# Patient Record
Sex: FEMALE | Race: WHITE | Employment: FULL TIME | URBAN - NONMETROPOLITAN AREA
[De-identification: names, ages, dates, MRNs, and addresses within clinical notes are randomized per-mention and may not be internally consistent; named-entity substitution may affect disease eponyms.]

---

## 2017-03-30 ENCOUNTER — HOSPITAL ENCOUNTER (OUTPATIENT)
Dept: GENERAL RADIOLOGY | Age: 22
Discharge: HOME | End: 2017-03-30

## 2017-03-30 DIAGNOSIS — N63.10 LUMP OF RIGHT BREAST: ICD-10-CM

## 2018-11-14 ENCOUNTER — HOSPITAL ENCOUNTER (OUTPATIENT)
Dept: CARDIAC REHAB | Age: 23
Discharge: HOME | End: 2018-11-14
Payer: COMMERCIAL

## 2018-11-14 PROCEDURE — 93226 XTRNL ECG REC<48 HR SCAN A/R: CPT

## 2018-11-14 PROCEDURE — 93225 XTRNL ECG REC<48 HRS REC: CPT

## 2018-11-30 ENCOUNTER — HOSPITAL ENCOUNTER (EMERGENCY)
Age: 23
Discharge: HOME | End: 2018-12-01
Attending: EMERGENCY MEDICINE
Payer: COMMERCIAL

## 2018-11-30 DIAGNOSIS — T88.1XXA POSTIMMUNIZATION REACTION, INITIAL ENCOUNTER: ICD-10-CM

## 2018-11-30 DIAGNOSIS — R79.89 ELEVATED TSH: ICD-10-CM

## 2018-11-30 DIAGNOSIS — R53.83 FATIGUE, UNSPECIFIED TYPE: Primary | ICD-10-CM

## 2018-11-30 LAB
ALBUMIN SERPL-MCNC: 4.6 GM/DL (ref 3.4–5)
ALP BLD-CCNC: 43 IU/L (ref 40–129)
ALT SERPL-CCNC: 15 U/L (ref 10–40)
ANION GAP SERPL CALCULATED.3IONS-SCNC: 13 MMOL/L (ref 4–16)
AST SERPL-CCNC: 15 IU/L (ref 15–37)
BACTERIA: ABNORMAL /HPF
BASOPHILS ABSOLUTE: 0 K/CU MM
BASOPHILS RELATIVE PERCENT: 0.6 % (ref 0–1)
BILIRUB SERPL-MCNC: 0.3 MG/DL (ref 0–1)
BILIRUBIN URINE: NEGATIVE MG/DL
BLOOD, URINE: ABNORMAL
BUN BLDV-MCNC: 8 MG/DL (ref 6–23)
CALCIUM SERPL-MCNC: 10.3 MG/DL (ref 8.3–10.6)
CAST TYPE: ABNORMAL /HPF
CHLORIDE BLD-SCNC: 107 MMOL/L (ref 99–110)
CLARITY: CLEAR
CO2: 25 MMOL/L (ref 21–32)
COLOR: ABNORMAL
CREAT SERPL-MCNC: 0.7 MG/DL (ref 0.6–1.1)
CRYSTAL TYPE: ABNORMAL /HPF
DIFFERENTIAL TYPE: ABNORMAL
EOSINOPHILS ABSOLUTE: 0.2 K/CU MM
EOSINOPHILS RELATIVE PERCENT: 3.2 % (ref 0–3)
EPITHELIAL CELLS, UA: ABNORMAL /HPF
GFR AFRICAN AMERICAN: >60 ML/MIN/1.73M2
GFR NON-AFRICAN AMERICAN: >60 ML/MIN/1.73M2
GLUCOSE BLD-MCNC: 89 MG/DL (ref 70–99)
GLUCOSE, URINE: NEGATIVE MG/DL
HCT VFR BLD CALC: 38.9 % (ref 37–47)
HEMOGLOBIN: 12.9 GM/DL (ref 12.5–16)
IMMATURE NEUTROPHIL %: 0.3 % (ref 0–0.43)
KETONES, URINE: NEGATIVE MG/DL
LEUKOCYTE ESTERASE, URINE: ABNORMAL
LYMPHOCYTES ABSOLUTE: 3.1 K/CU MM
LYMPHOCYTES RELATIVE PERCENT: 43.1 % (ref 24–44)
MCH RBC QN AUTO: 29.8 PG (ref 27–31)
MCHC RBC AUTO-ENTMCNC: 33.2 % (ref 32–36)
MCV RBC AUTO: 89.8 FL (ref 78–100)
MONOCYTES ABSOLUTE: 0.4 K/CU MM
MONOCYTES RELATIVE PERCENT: 5.6 % (ref 0–4)
MUCUS: NEGATIVE HPF
NITRITE URINE, QUANTITATIVE: NEGATIVE
PDW BLD-RTO: 13.3 % (ref 11.7–14.9)
PH, URINE: 6.5 (ref 5–8)
PLATELET # BLD: 315 K/CU MM (ref 140–440)
PMV BLD AUTO: 9.4 FL (ref 7.5–11.1)
POTASSIUM SERPL-SCNC: 3.9 MMOL/L (ref 3.5–5.1)
PROTEIN UA: ABNORMAL MG/DL
RAPID INFLUENZA  B AGN: NEGATIVE
RAPID INFLUENZA A AGN: NEGATIVE
RBC # BLD: 4.33 M/CU MM (ref 4.2–5.4)
RBC URINE: ABNORMAL /HPF (ref 0–6)
SEGMENTED NEUTROPHILS ABSOLUTE COUNT: 3.4 K/CU MM
SEGMENTED NEUTROPHILS RELATIVE PERCENT: 47.2 % (ref 36–66)
SODIUM BLD-SCNC: 145 MMOL/L (ref 135–145)
SPECIFIC GRAVITY UA: <1.005 (ref 1–1.03)
TOTAL CK: 62 IU/L (ref 26–140)
TOTAL IMMATURE NEUTOROPHIL: 0.02 K/CU MM
TOTAL PROTEIN: 7.2 GM/DL (ref 6.4–8.2)
UROBILINOGEN, URINE: 0.2 MG/DL (ref 0.2–1)
VOLUME, (UVOL): 12 ML (ref 10–12)
WBC # BLD: 7.1 K/CU MM (ref 4–10.5)
WBC UA: ABNORMAL /HPF (ref 0–5)

## 2018-11-30 PROCEDURE — 87804 INFLUENZA ASSAY W/OPTIC: CPT

## 2018-11-30 PROCEDURE — 80053 COMPREHEN METABOLIC PANEL: CPT

## 2018-11-30 PROCEDURE — 84443 ASSAY THYROID STIM HORMONE: CPT

## 2018-11-30 PROCEDURE — 81001 URINALYSIS AUTO W/SCOPE: CPT

## 2018-11-30 PROCEDURE — 82550 ASSAY OF CK (CPK): CPT

## 2018-11-30 PROCEDURE — 85025 COMPLETE CBC W/AUTO DIFF WBC: CPT

## 2018-11-30 PROCEDURE — 99283 EMERGENCY DEPT VISIT LOW MDM: CPT

## 2018-11-30 PROCEDURE — 84703 CHORIONIC GONADOTROPIN ASSAY: CPT

## 2018-11-30 RX ORDER — IBUPROFEN 800 MG/1
800 TABLET ORAL ONCE
Status: DISCONTINUED | OUTPATIENT
Start: 2018-11-30 | End: 2018-12-01 | Stop reason: HOSPADM

## 2018-12-01 VITALS
HEIGHT: 67 IN | RESPIRATION RATE: 14 BRPM | BODY MASS INDEX: 25.9 KG/M2 | OXYGEN SATURATION: 99 % | WEIGHT: 165 LBS | HEART RATE: 80 BPM | SYSTOLIC BLOOD PRESSURE: 122 MMHG | DIASTOLIC BLOOD PRESSURE: 68 MMHG | TEMPERATURE: 98.6 F

## 2018-12-01 LAB
HCG QUALITATIVE: NEGATIVE
TSH HIGH SENSITIVITY: 6.22 UIU/ML (ref 0.27–4.2)

## 2019-02-12 ENCOUNTER — APPOINTMENT (OUTPATIENT)
Dept: GENERAL RADIOLOGY | Age: 24
End: 2019-02-12
Payer: COMMERCIAL

## 2019-02-12 ENCOUNTER — HOSPITAL ENCOUNTER (EMERGENCY)
Age: 24
Discharge: HOME | End: 2019-02-12
Attending: EMERGENCY MEDICINE
Payer: COMMERCIAL

## 2019-02-12 VITALS
HEIGHT: 67 IN | TEMPERATURE: 97.6 F | SYSTOLIC BLOOD PRESSURE: 136 MMHG | WEIGHT: 166 LBS | HEART RATE: 77 BPM | RESPIRATION RATE: 20 BRPM | OXYGEN SATURATION: 99 % | BODY MASS INDEX: 26.06 KG/M2 | DIASTOLIC BLOOD PRESSURE: 95 MMHG

## 2019-02-12 DIAGNOSIS — F41.1 ANXIETY STATE: ICD-10-CM

## 2019-02-12 DIAGNOSIS — R07.89 ATYPICAL CHEST PAIN: Primary | ICD-10-CM

## 2019-02-12 LAB
ANION GAP SERPL CALCULATED.3IONS-SCNC: 14 MMOL/L (ref 4–16)
BASOPHILS ABSOLUTE: 0.1 K/CU MM
BASOPHILS RELATIVE PERCENT: 0.9 % (ref 0–1)
BUN BLDV-MCNC: 11 MG/DL (ref 6–23)
CALCIUM SERPL-MCNC: 9.9 MG/DL (ref 8.3–10.6)
CHLORIDE BLD-SCNC: 101 MMOL/L (ref 99–110)
CO2: 26 MMOL/L (ref 21–32)
CREAT SERPL-MCNC: 0.8 MG/DL (ref 0.6–1.1)
DIFFERENTIAL TYPE: ABNORMAL
EOSINOPHILS ABSOLUTE: 0.3 K/CU MM
EOSINOPHILS RELATIVE PERCENT: 3.9 % (ref 0–3)
GFR AFRICAN AMERICAN: >60 ML/MIN/1.73M2
GFR NON-AFRICAN AMERICAN: >60 ML/MIN/1.73M2
GLUCOSE BLD-MCNC: 97 MG/DL (ref 70–99)
HCT VFR BLD CALC: 42.1 % (ref 37–47)
HEMOGLOBIN: 13.9 GM/DL (ref 12.5–16)
IMMATURE NEUTROPHIL %: 0.1 % (ref 0–0.43)
LYMPHOCYTES ABSOLUTE: 2.8 K/CU MM
LYMPHOCYTES RELATIVE PERCENT: 41.2 % (ref 24–44)
MCH RBC QN AUTO: 29.4 PG (ref 27–31)
MCHC RBC AUTO-ENTMCNC: 33 % (ref 32–36)
MCV RBC AUTO: 89.2 FL (ref 78–100)
MONOCYTES ABSOLUTE: 0.4 K/CU MM
MONOCYTES RELATIVE PERCENT: 6.4 % (ref 0–4)
PDW BLD-RTO: 12.8 % (ref 11.7–14.9)
PLATELET # BLD: 326 K/CU MM (ref 140–440)
PMV BLD AUTO: 9.2 FL (ref 7.5–11.1)
POTASSIUM SERPL-SCNC: 3.6 MMOL/L (ref 3.5–5.1)
RBC # BLD: 4.72 M/CU MM (ref 4.2–5.4)
SEGMENTED NEUTROPHILS ABSOLUTE COUNT: 3.2 K/CU MM
SEGMENTED NEUTROPHILS RELATIVE PERCENT: 47.5 % (ref 36–66)
SODIUM BLD-SCNC: 141 MMOL/L (ref 135–145)
TOTAL IMMATURE NEUTOROPHIL: 0.01 K/CU MM
TROPONIN T: <0.01 NG/ML
WBC # BLD: 6.8 K/CU MM (ref 4–10.5)

## 2019-02-12 PROCEDURE — 99285 EMERGENCY DEPT VISIT HI MDM: CPT

## 2019-02-12 PROCEDURE — 93005 ELECTROCARDIOGRAM TRACING: CPT | Performed by: EMERGENCY MEDICINE

## 2019-02-12 PROCEDURE — 71045 X-RAY EXAM CHEST 1 VIEW: CPT

## 2019-02-12 PROCEDURE — 96374 THER/PROPH/DIAG INJ IV PUSH: CPT

## 2019-02-12 PROCEDURE — 6370000000 HC RX 637 (ALT 250 FOR IP): Performed by: EMERGENCY MEDICINE

## 2019-02-12 PROCEDURE — 85025 COMPLETE CBC W/AUTO DIFF WBC: CPT

## 2019-02-12 PROCEDURE — 84484 ASSAY OF TROPONIN QUANT: CPT

## 2019-02-12 PROCEDURE — 80048 BASIC METABOLIC PNL TOTAL CA: CPT

## 2019-02-12 RX ORDER — LORAZEPAM 2 MG/ML
1 INJECTION INTRAMUSCULAR ONCE
Status: DISCONTINUED | OUTPATIENT
Start: 2019-02-12 | End: 2019-02-12 | Stop reason: HOSPADM

## 2019-02-12 RX ORDER — HYDROXYZINE HYDROCHLORIDE 25 MG/1
25 TABLET, FILM COATED ORAL EVERY 8 HOURS PRN
Qty: 30 TABLET | Refills: 0 | Status: SHIPPED | OUTPATIENT
Start: 2019-02-12 | End: 2019-08-01

## 2019-02-12 RX ORDER — HYDROXYZINE PAMOATE 25 MG/1
50 CAPSULE ORAL ONCE
Status: COMPLETED | OUTPATIENT
Start: 2019-02-12 | End: 2019-02-12

## 2019-02-12 RX ADMIN — HYDROXYZINE PAMOATE 25 MG: 25 CAPSULE ORAL at 20:12

## 2019-02-12 ASSESSMENT — PAIN DESCRIPTION - PAIN TYPE: TYPE: ACUTE PAIN

## 2019-02-12 ASSESSMENT — ENCOUNTER SYMPTOMS
EYES NEGATIVE: 1
SHORTNESS OF BREATH: 0
RESPIRATORY NEGATIVE: 1
VOMITING: 0
HEARTBURN: 0
BACK PAIN: 0
NAUSEA: 0
GASTROINTESTINAL NEGATIVE: 1
ORTHOPNEA: 0

## 2019-02-12 ASSESSMENT — PAIN SCALES - GENERAL: PAINLEVEL_OUTOF10: 5

## 2019-02-12 ASSESSMENT — PAIN DESCRIPTION - LOCATION: LOCATION: CHEST

## 2019-02-13 PROCEDURE — 93010 ELECTROCARDIOGRAM REPORT: CPT | Performed by: INTERNAL MEDICINE

## 2019-02-15 LAB
EKG ATRIAL RATE: 98 BPM
EKG DIAGNOSIS: NORMAL
EKG P AXIS: 71 DEGREES
EKG P-R INTERVAL: 126 MS
EKG Q-T INTERVAL: 354 MS
EKG QRS DURATION: 64 MS
EKG QTC CALCULATION (BAZETT): 451 MS
EKG R AXIS: 58 DEGREES
EKG T AXIS: 48 DEGREES
EKG VENTRICULAR RATE: 98 BPM

## 2019-08-01 ENCOUNTER — APPOINTMENT (OUTPATIENT)
Dept: GENERAL RADIOLOGY | Age: 24
End: 2019-08-01

## 2019-08-01 ENCOUNTER — HOSPITAL ENCOUNTER (EMERGENCY)
Age: 24
Discharge: HOME | End: 2019-08-01
Attending: EMERGENCY MEDICINE

## 2019-08-01 VITALS
SYSTOLIC BLOOD PRESSURE: 132 MMHG | TEMPERATURE: 99.2 F | HEART RATE: 125 BPM | OXYGEN SATURATION: 97 % | RESPIRATION RATE: 16 BRPM | DIASTOLIC BLOOD PRESSURE: 98 MMHG

## 2019-08-01 DIAGNOSIS — B34.9 VIRAL ILLNESS: Primary | ICD-10-CM

## 2019-08-01 DIAGNOSIS — R53.83 FATIGUE, UNSPECIFIED TYPE: ICD-10-CM

## 2019-08-01 LAB
BACTERIA: ABNORMAL /HPF
BILIRUBIN URINE: NEGATIVE MG/DL
BLOOD, URINE: NEGATIVE
CAST TYPE: NEGATIVE /HPF
CLARITY: CLEAR
COLOR: YELLOW
CRYSTAL TYPE: NEGATIVE /HPF
EPITHELIAL CELLS, UA: ABNORMAL /HPF
GLUCOSE, URINE: NEGATIVE MG/DL
INTERPRETATION: NORMAL
KETONES, URINE: ABNORMAL MG/DL
LEUKOCYTE ESTERASE, URINE: NEGATIVE
NITRITE URINE, QUANTITATIVE: NEGATIVE
PH, URINE: 5 (ref 5–8)
PREGNANCY, URINE: NEGATIVE
PROTEIN UA: NEGATIVE MG/DL
RBC URINE: NEGATIVE /HPF (ref 0–6)
SPECIFIC GRAVITY UA: 1.01 (ref 1–1.03)
SPECIFIC GRAVITY, URINE: 1.01 (ref 1–1.03)
UROBILINOGEN, URINE: 0.2 MG/DL (ref 0.2–1)
WBC UA: ABNORMAL /HPF (ref 0–5)

## 2019-08-01 PROCEDURE — 81001 URINALYSIS AUTO W/SCOPE: CPT

## 2019-08-01 PROCEDURE — 6370000000 HC RX 637 (ALT 250 FOR IP): Performed by: EMERGENCY MEDICINE

## 2019-08-01 PROCEDURE — 81025 URINE PREGNANCY TEST: CPT

## 2019-08-01 PROCEDURE — 99283 EMERGENCY DEPT VISIT LOW MDM: CPT

## 2019-08-01 PROCEDURE — 71046 X-RAY EXAM CHEST 2 VIEWS: CPT

## 2019-08-01 RX ORDER — ACETAMINOPHEN 500 MG
1000 TABLET ORAL ONCE
Status: COMPLETED | OUTPATIENT
Start: 2019-08-01 | End: 2019-08-01

## 2019-08-01 RX ORDER — ACETAMINOPHEN 80 MG
TABLET,CHEWABLE ORAL
Status: DISCONTINUED
Start: 2019-08-01 | End: 2019-08-01 | Stop reason: HOSPADM

## 2019-08-01 RX ADMIN — ACETAMINOPHEN 1000 MG: 500 TABLET ORAL at 01:31

## 2019-08-01 ASSESSMENT — ENCOUNTER SYMPTOMS
HEMATOCHEZIA: 0
SHORTNESS OF BREATH: 0
VOMITING: 0
NAUSEA: 1

## 2019-08-01 ASSESSMENT — PAIN SCALES - GENERAL: PAINLEVEL_OUTOF10: 10

## 2019-08-01 ASSESSMENT — PAIN DESCRIPTION - LOCATION: LOCATION: GENERALIZED

## 2019-08-01 NOTE — ED PROVIDER NOTES
diagnosis, differential diagnosis, medications, medication use,  medication safety and medication interactions have been explained and outlined to this patient for thispatient encounter. I have stressed need for follow up and reexamination for this encounter and or return to the emergency department if any changes or any concern. Final Impression    1. Viral illness    2.  Fatigue, unspecified type              287 Sofie Dunham DO  08/01/19 8090

## 2019-09-21 ENCOUNTER — HOSPITAL ENCOUNTER (EMERGENCY)
Age: 24
Discharge: HOME | End: 2019-09-21
Attending: EMERGENCY MEDICINE
Payer: COMMERCIAL

## 2019-09-21 VITALS
HEART RATE: 98 BPM | OXYGEN SATURATION: 98 % | TEMPERATURE: 97.8 F | DIASTOLIC BLOOD PRESSURE: 93 MMHG | SYSTOLIC BLOOD PRESSURE: 135 MMHG | WEIGHT: 163 LBS | HEIGHT: 68 IN | RESPIRATION RATE: 18 BRPM | BODY MASS INDEX: 24.71 KG/M2

## 2019-09-21 DIAGNOSIS — R00.2 PALPITATIONS: ICD-10-CM

## 2019-09-21 DIAGNOSIS — F41.1 ANXIETY STATE: Primary | ICD-10-CM

## 2019-09-21 PROCEDURE — 99283 EMERGENCY DEPT VISIT LOW MDM: CPT

## 2019-09-21 PROCEDURE — 93010 ELECTROCARDIOGRAM REPORT: CPT | Performed by: INTERNAL MEDICINE

## 2019-09-21 PROCEDURE — 93005 ELECTROCARDIOGRAM TRACING: CPT | Performed by: EMERGENCY MEDICINE

## 2019-09-21 RX ORDER — HYDROXYZINE HYDROCHLORIDE 25 MG/1
25 TABLET, FILM COATED ORAL 3 TIMES DAILY PRN
COMMUNITY
End: 2022-01-06 | Stop reason: ALTCHOICE

## 2019-09-21 ASSESSMENT — ENCOUNTER SYMPTOMS
ALLERGIC/IMMUNOLOGIC NEGATIVE: 1
RESPIRATORY NEGATIVE: 1
EYES NEGATIVE: 1
GASTROINTESTINAL NEGATIVE: 1

## 2019-09-21 NOTE — ED NOTES
Patient presents to the ED with a panic attack, patient states that she had a couple days this past week that that was \"traumatizing\", patient admits that she has not taken her medication, and realizes that there are times that will require her to use it.        Kalyn Carey RN  09/21/19 0224

## 2019-09-25 LAB
EKG ATRIAL RATE: 97 BPM
EKG DIAGNOSIS: NORMAL
EKG P AXIS: 78 DEGREES
EKG P-R INTERVAL: 154 MS
EKG Q-T INTERVAL: 372 MS
EKG QRS DURATION: 80 MS
EKG QTC CALCULATION (BAZETT): 472 MS
EKG R AXIS: 66 DEGREES
EKG T AXIS: 56 DEGREES
EKG VENTRICULAR RATE: 97 BPM

## 2019-10-05 ENCOUNTER — HOSPITAL ENCOUNTER (OUTPATIENT)
Age: 24
Discharge: HOME | End: 2019-10-05
Payer: COMMERCIAL

## 2019-10-05 LAB
ALBUMIN SERPL-MCNC: 4.6 GM/DL (ref 3.4–5)
ALP BLD-CCNC: 44 IU/L (ref 40–129)
ALT SERPL-CCNC: 14 U/L (ref 10–40)
ANION GAP SERPL CALCULATED.3IONS-SCNC: 9 MMOL/L (ref 4–16)
AST SERPL-CCNC: 16 IU/L (ref 15–37)
BILIRUB SERPL-MCNC: 0.5 MG/DL (ref 0–1)
BUN BLDV-MCNC: 13 MG/DL (ref 6–23)
CALCIUM SERPL-MCNC: 9.8 MG/DL (ref 8.3–10.6)
CHLORIDE BLD-SCNC: 105 MMOL/L (ref 99–110)
CO2: 28 MMOL/L (ref 21–32)
CREAT SERPL-MCNC: 0.8 MG/DL (ref 0.6–1.1)
GFR AFRICAN AMERICAN: >60 ML/MIN/1.73M2
GFR NON-AFRICAN AMERICAN: >60 ML/MIN/1.73M2
GLUCOSE FASTING: 90 MG/DL (ref 70–99)
HCT VFR BLD CALC: 39.6 % (ref 37–47)
HEMOGLOBIN: 12.7 GM/DL (ref 12.5–16)
MCH RBC QN AUTO: 29.1 PG (ref 27–31)
MCHC RBC AUTO-ENTMCNC: 32.1 % (ref 32–36)
MCV RBC AUTO: 90.6 FL (ref 78–100)
PDW BLD-RTO: 14 % (ref 11.7–14.9)
PLATELET # BLD: 288 K/CU MM (ref 140–440)
PMV BLD AUTO: 9.2 FL (ref 7.5–11.1)
POTASSIUM SERPL-SCNC: 4.3 MMOL/L (ref 3.5–5.1)
RBC # BLD: 4.37 M/CU MM (ref 4.2–5.4)
SODIUM BLD-SCNC: 142 MMOL/L (ref 135–145)
TOTAL PROTEIN: 7.4 GM/DL (ref 6.4–8.2)
WBC # BLD: 5.7 K/CU MM (ref 4–10.5)

## 2019-10-05 PROCEDURE — 80061 LIPID PANEL: CPT

## 2019-10-05 PROCEDURE — 36415 COLL VENOUS BLD VENIPUNCTURE: CPT

## 2019-10-05 PROCEDURE — 80053 COMPREHEN METABOLIC PANEL: CPT

## 2019-10-05 PROCEDURE — 85027 COMPLETE CBC AUTOMATED: CPT

## 2019-10-06 LAB
CHOLESTEROL, FASTING: 102 MG/DL
HDLC SERPL-MCNC: 68 MG/DL
LDL CHOLESTEROL DIRECT: 31 MG/DL
TRIGLYCERIDE, FASTING: 11 MG/DL

## 2019-11-05 ENCOUNTER — HOSPITAL ENCOUNTER (EMERGENCY)
Age: 24
Discharge: HOME | End: 2019-11-05
Attending: EMERGENCY MEDICINE
Payer: MEDICAID

## 2019-11-05 VITALS
OXYGEN SATURATION: 100 % | TEMPERATURE: 98 F | BODY MASS INDEX: 25.9 KG/M2 | HEART RATE: 84 BPM | RESPIRATION RATE: 15 BRPM | WEIGHT: 165 LBS | DIASTOLIC BLOOD PRESSURE: 87 MMHG | HEIGHT: 67 IN | SYSTOLIC BLOOD PRESSURE: 129 MMHG

## 2019-11-05 DIAGNOSIS — F41.1 ANXIETY STATE: Primary | ICD-10-CM

## 2019-11-05 PROCEDURE — 93010 ELECTROCARDIOGRAM REPORT: CPT | Performed by: INTERNAL MEDICINE

## 2019-11-05 PROCEDURE — 99284 EMERGENCY DEPT VISIT MOD MDM: CPT

## 2019-11-05 PROCEDURE — 93005 ELECTROCARDIOGRAM TRACING: CPT | Performed by: EMERGENCY MEDICINE

## 2019-11-05 RX ORDER — FLUOXETINE HYDROCHLORIDE 20 MG/1
20 CAPSULE ORAL DAILY
COMMUNITY
End: 2020-01-03 | Stop reason: ALTCHOICE

## 2019-11-07 LAB
EKG ATRIAL RATE: 90 BPM
EKG DIAGNOSIS: NORMAL
EKG P AXIS: 63 DEGREES
EKG P-R INTERVAL: 122 MS
EKG Q-T INTERVAL: 372 MS
EKG QRS DURATION: 64 MS
EKG QTC CALCULATION (BAZETT): 455 MS
EKG R AXIS: 58 DEGREES
EKG T AXIS: 50 DEGREES
EKG VENTRICULAR RATE: 90 BPM

## 2020-01-03 ENCOUNTER — HOSPITAL ENCOUNTER (EMERGENCY)
Age: 25
Discharge: HOME | End: 2020-01-03
Attending: EMERGENCY MEDICINE
Payer: COMMERCIAL

## 2020-01-03 ENCOUNTER — APPOINTMENT (OUTPATIENT)
Dept: GENERAL RADIOLOGY | Age: 25
End: 2020-01-03
Payer: COMMERCIAL

## 2020-01-03 VITALS
WEIGHT: 157 LBS | TEMPERATURE: 98.6 F | DIASTOLIC BLOOD PRESSURE: 87 MMHG | HEIGHT: 67 IN | OXYGEN SATURATION: 100 % | BODY MASS INDEX: 24.64 KG/M2 | SYSTOLIC BLOOD PRESSURE: 121 MMHG | HEART RATE: 132 BPM | RESPIRATION RATE: 17 BRPM

## 2020-01-03 LAB
ALBUMIN SERPL-MCNC: 4.8 GM/DL (ref 3.4–5)
ALP BLD-CCNC: 44 IU/L (ref 40–129)
ALT SERPL-CCNC: 13 U/L (ref 10–40)
ANION GAP SERPL CALCULATED.3IONS-SCNC: 19 MMOL/L (ref 4–16)
AST SERPL-CCNC: 12 IU/L (ref 15–37)
BASOPHILS ABSOLUTE: 0 K/CU MM
BASOPHILS RELATIVE PERCENT: 0.4 % (ref 0–1)
BILIRUB SERPL-MCNC: 0.6 MG/DL (ref 0–1)
BUN BLDV-MCNC: 14 MG/DL (ref 6–23)
CALCIUM SERPL-MCNC: 9.9 MG/DL (ref 8.3–10.6)
CHLORIDE BLD-SCNC: 99 MMOL/L (ref 99–110)
CO2: 21 MMOL/L (ref 21–32)
CREAT SERPL-MCNC: 0.9 MG/DL (ref 0.6–1.1)
D DIMER: <200 NG/ML(DDU)
DIFFERENTIAL TYPE: ABNORMAL
EOSINOPHILS ABSOLUTE: 0.1 K/CU MM
EOSINOPHILS RELATIVE PERCENT: 2.1 % (ref 0–3)
GFR AFRICAN AMERICAN: >60 ML/MIN/1.73M2
GFR NON-AFRICAN AMERICAN: >60 ML/MIN/1.73M2
GLUCOSE BLD-MCNC: 95 MG/DL (ref 70–99)
HCT VFR BLD CALC: 42.8 % (ref 37–47)
HEMOGLOBIN: 14.4 GM/DL (ref 12.5–16)
IMMATURE NEUTROPHIL %: 0.3 % (ref 0–0.43)
LYMPHOCYTES ABSOLUTE: 1.9 K/CU MM
LYMPHOCYTES RELATIVE PERCENT: 27.8 % (ref 24–44)
MCH RBC QN AUTO: 29.6 PG (ref 27–31)
MCHC RBC AUTO-ENTMCNC: 33.6 % (ref 32–36)
MCV RBC AUTO: 87.9 FL (ref 78–100)
MONOCYTES ABSOLUTE: 0.3 K/CU MM
MONOCYTES RELATIVE PERCENT: 5 % (ref 0–4)
PDW BLD-RTO: 13.2 % (ref 11.7–14.9)
PLATELET # BLD: 319 K/CU MM (ref 140–440)
PMV BLD AUTO: 8.9 FL (ref 7.5–11.1)
POTASSIUM SERPL-SCNC: 3.7 MMOL/L (ref 3.5–5.1)
RBC # BLD: 4.87 M/CU MM (ref 4.2–5.4)
SEGMENTED NEUTROPHILS ABSOLUTE COUNT: 4.4 K/CU MM
SEGMENTED NEUTROPHILS RELATIVE PERCENT: 64.4 % (ref 36–66)
SODIUM BLD-SCNC: 139 MMOL/L (ref 135–145)
TOTAL IMMATURE NEUTOROPHIL: 0.02 K/CU MM
TOTAL PROTEIN: 7.8 GM/DL (ref 6.4–8.2)
TROPONIN T: <0.01 NG/ML
WBC # BLD: 6.8 K/CU MM (ref 4–10.5)

## 2020-01-03 PROCEDURE — 99285 EMERGENCY DEPT VISIT HI MDM: CPT

## 2020-01-03 PROCEDURE — 71045 X-RAY EXAM CHEST 1 VIEW: CPT

## 2020-01-03 PROCEDURE — 93010 ELECTROCARDIOGRAM REPORT: CPT | Performed by: INTERNAL MEDICINE

## 2020-01-03 PROCEDURE — 85379 FIBRIN DEGRADATION QUANT: CPT

## 2020-01-03 PROCEDURE — 6370000000 HC RX 637 (ALT 250 FOR IP): Performed by: EMERGENCY MEDICINE

## 2020-01-03 PROCEDURE — 85025 COMPLETE CBC W/AUTO DIFF WBC: CPT

## 2020-01-03 PROCEDURE — 93005 ELECTROCARDIOGRAM TRACING: CPT | Performed by: EMERGENCY MEDICINE

## 2020-01-03 PROCEDURE — 80053 COMPREHEN METABOLIC PANEL: CPT

## 2020-01-03 PROCEDURE — 84484 ASSAY OF TROPONIN QUANT: CPT

## 2020-01-03 RX ORDER — HYDROXYZINE PAMOATE 25 MG/1
25 CAPSULE ORAL ONCE
Status: COMPLETED | OUTPATIENT
Start: 2020-01-03 | End: 2020-01-03

## 2020-01-03 RX ORDER — M-VIT,TX,IRON,MINS/CALC/FOLIC 27MG-0.4MG
1 TABLET ORAL DAILY
COMMUNITY
End: 2020-07-16

## 2020-01-03 RX ADMIN — HYDROXYZINE PAMOATE 25 MG: 25 CAPSULE ORAL at 15:12

## 2020-01-03 NOTE — ED NOTES
Discharge instructions reviewed with patient. Reviewed prescriptions with patient. No additional questions asked. Voiced understanding. Encouraged patient to follow up as discussed by the ED physician.      Rosas Veronica RN  01/03/20 0970

## 2020-01-03 NOTE — ED PROVIDER NOTES
Emergency Department Encounter  Location: [facility] [address] [place] [address]    Patient: Aziza Piña  MRN: [id number]  : 1995  Date of evaluation: 1/3/2020  ED Provider: Xochilt Nieves DO, FACEP    Chief Complaint:    Chest Pain (States was having chest pain/back off and on this morning. Woke up around 1300 and started moving around and did not feel well. States is feeling anxious. Denies recent illness or cough. Denies fever or chills. Denies nausea or vomiting. States took Buspar for a couple weeks then weaned herself off a couple days ago. )    [place]:  Aziza Piña is a 30 y.o. female that presents to the emergency department complaints of chest and back pain this morning. She became very anxious and states she was not feeling well. She states she can feel her heart beating fast but she is not sure whether it secondary to her feeling anxious or medical problem. Patient was on BuSpar but took herself off that a few days ago because she was having some itchiness. She was able to wean herself down. She did not stop it abruptly. She presents to the emergency department with tachycardia with a heart rate up as high into the 150s. She has had previous panic attacks that have caused this problem before. She states she is working very hard to try to get her anxiety under control. She has been using Vistaril for anxiety. She is requesting a dose of that at this time. ROS:  At least 10 systems reviewed and otherwise acutely negative except as in the [address] Past Medical History:   Diagnosis Date    Generalized anxiety disorder 3/4/2014    Did not tolerate bupar at 15mg BID, does well at 7.5mg BID. Off meds now, however.  Ovarian cyst     Palpitations 4/15/2014    Neg w/u with cardio, see chart.  Panic attacks     Pre-syncope 4/15/2014    Neg w/u with cardio, see chart.        Past Surgical History:   Procedure Laterality Date    ESOPHAGOSCOPY      WISDOM TOOTH EXTRACTION Family History   Problem Relation Age of Onset    Colon Cancer Maternal Grandmother     Cancer Maternal Grandfather     Heart Disease Paternal Grandfather     Heart Attack Paternal Grandfather     Breast Cancer Neg Hx      Social History     Socioeconomic History    Marital status: Single     Spouse name: Not on file    Number of children: Not on file    Years of education: Not on file    Highest education level: Not on file   Occupational History    Not on file   Social Needs    Financial resource strain: Not on file    Food insecurity:     Worry: Not on file     Inability: Not on file    Transportation needs:     Medical: Not on file     Non-medical: Not on file   Tobacco Use    Smoking status: Never Smoker    Smokeless tobacco: Never Used   Substance and Sexual Activity    Alcohol use: Yes     Comment: occasionally    Drug use: No    Sexual activity: Never     Partners: Male   Lifestyle    Physical activity:     Days per week: Not on file     Minutes per session: Not on file    Stress: Not on file   Relationships    Social connections:     Talks on phone: Not on file     Gets together: Not on file     Attends Moravian service: Not on file     Active member of club or organization: Not on file     Attends meetings of clubs or organizations: Not on file     Relationship status: Not on file    Intimate partner violence:     Fear of current or ex partner: Not on file     Emotionally abused: Not on file     Physically abused: Not on file     Forced sexual activity: Not on file   Other Topics Concern    Not on file   Social History Narrative    Not on file     No current facility-administered medications for this encounter.       Current Outpatient Medications   Medication Sig Dispense Refill    Multiple Vitamins-Minerals (THERAPEUTIC MULTIVITAMIN-MINERALS) tablet Take 1 tablet by mouth daily      hydrOXYzine (ATARAX) 25 MG tablet Take 25 mg by mouth 3 times daily as needed for Itching afraid that I am going to give her bad news. The patient is encouraged to call her psychiatric provider to determine what other medication she may try for her anxiety. She is discharged in stable condition at this time. Final Impression:  1.  Anxiety state      DISPOSITION Decision To Discharge    Patient referred to:  Carlos King PA-C  [facility],[address]  [phone]    Schedule an appointment as soon as possible for a visit in 1 week  For follow up    Discharge medications:  Discharge Medication List as of 1/3/2020  4:39 PM        (Please note that portions of this note may have been completed with a voice recognition program. Efforts were made to edit the dictations but occasionally words are mis-transcribed.)    Leilani Nelson DO, [facility], [place], [facility],[address]  Board certified in [facility] Yovani Nash, [address] [place] [address]  01/03/20 5936

## 2020-01-03 NOTE — ED NOTES
Patient had her call light on and stated that she is feeling anxious. She also stated that her head was feeling \"funny'. Dr. Penny Sharpe was notified and went in to speak with this patient.      Shiv Liang  01/03/20 2056

## 2020-01-07 LAB
EKG ATRIAL RATE: 155 BPM
EKG DIAGNOSIS: NORMAL
EKG P AXIS: 79 DEGREES
EKG P-R INTERVAL: 130 MS
EKG Q-T INTERVAL: 252 MS
EKG QRS DURATION: 64 MS
EKG QTC CALCULATION (BAZETT): 404 MS
EKG R AXIS: 64 DEGREES
EKG T AXIS: 42 DEGREES
EKG VENTRICULAR RATE: 155 BPM

## 2020-02-06 ENCOUNTER — HOSPITAL ENCOUNTER (OUTPATIENT)
Age: 25
Setting detail: SPECIMEN
Discharge: HOME | End: 2020-02-06

## 2020-02-06 PROCEDURE — 86481 TB AG RESPONSE T-CELL SUSP: CPT

## 2020-02-09 LAB
NIL (NEGATIVE) SPOT CONTROL: NORMAL
PANEL A SPOT COUNT: 0
PANEL B SPOT COUNT: 1
POSITIVE CONTROL SPOT COUNT: NORMAL
POSITIVE CONTROL SPOT COUNT: NORMAL
TB CELL IMMUNE MEASURE: NORMAL

## 2020-02-12 ENCOUNTER — HOSPITAL ENCOUNTER (OUTPATIENT)
Age: 25
Discharge: HOME | End: 2020-02-12
Payer: COMMERCIAL

## 2020-02-12 LAB
ALBUMIN SERPL-MCNC: 4.6 GM/DL (ref 3.4–5)
ALP BLD-CCNC: 45 IU/L (ref 40–129)
ALT SERPL-CCNC: 19 U/L (ref 10–40)
ANION GAP SERPL CALCULATED.3IONS-SCNC: 4 MMOL/L (ref 4–16)
AST SERPL-CCNC: 15 IU/L (ref 15–37)
BASOPHILS ABSOLUTE: 0 K/CU MM
BASOPHILS RELATIVE PERCENT: 0.6 % (ref 0–1)
BILIRUB SERPL-MCNC: 0.4 MG/DL (ref 0–1)
BUN BLDV-MCNC: 10 MG/DL (ref 6–23)
CALCIUM SERPL-MCNC: 10.3 MG/DL (ref 8.3–10.6)
CHLORIDE BLD-SCNC: 100 MMOL/L (ref 99–110)
CO2: 35 MMOL/L (ref 21–32)
CREAT SERPL-MCNC: 0.7 MG/DL (ref 0.6–1.1)
DIFFERENTIAL TYPE: ABNORMAL
EOSINOPHILS ABSOLUTE: 0.2 K/CU MM
EOSINOPHILS RELATIVE PERCENT: 3.2 % (ref 0–3)
GFR AFRICAN AMERICAN: >60 ML/MIN/1.73M2
GFR NON-AFRICAN AMERICAN: >60 ML/MIN/1.73M2
GLUCOSE FASTING: 89 MG/DL (ref 70–99)
HCT VFR BLD CALC: 39.8 % (ref 37–47)
HEMOGLOBIN: 12.8 GM/DL (ref 12.5–16)
IMMATURE NEUTROPHIL %: 0.2 % (ref 0–0.43)
LYMPHOCYTES ABSOLUTE: 2.5 K/CU MM
LYMPHOCYTES RELATIVE PERCENT: 40.4 % (ref 24–44)
MCH RBC QN AUTO: 29.7 PG (ref 27–31)
MCHC RBC AUTO-ENTMCNC: 32.2 % (ref 32–36)
MCV RBC AUTO: 92.3 FL (ref 78–100)
MONOCYTES ABSOLUTE: 0.4 K/CU MM
MONOCYTES RELATIVE PERCENT: 5.8 % (ref 0–4)
PDW BLD-RTO: 13 % (ref 11.7–14.9)
PLATELET # BLD: 322 K/CU MM (ref 140–440)
PMV BLD AUTO: 9 FL (ref 7.5–11.1)
POTASSIUM SERPL-SCNC: 4.3 MMOL/L (ref 3.5–5.1)
RBC # BLD: 4.31 M/CU MM (ref 4.2–5.4)
SEGMENTED NEUTROPHILS ABSOLUTE COUNT: 3.1 K/CU MM
SEGMENTED NEUTROPHILS RELATIVE PERCENT: 49.8 % (ref 36–66)
SODIUM BLD-SCNC: 139 MMOL/L (ref 135–145)
TOTAL IMMATURE NEUTOROPHIL: 0.01 K/CU MM
TOTAL PROTEIN: 7.6 GM/DL (ref 6.4–8.2)
TSH HIGH SENSITIVITY: 1.55 UIU/ML (ref 0.27–4.2)
WBC # BLD: 6.2 K/CU MM (ref 4–10.5)

## 2020-02-12 PROCEDURE — 36415 COLL VENOUS BLD VENIPUNCTURE: CPT

## 2020-02-12 PROCEDURE — 85025 COMPLETE CBC W/AUTO DIFF WBC: CPT

## 2020-02-12 PROCEDURE — 80053 COMPREHEN METABOLIC PANEL: CPT

## 2020-02-12 PROCEDURE — 84443 ASSAY THYROID STIM HORMONE: CPT

## 2020-07-16 ENCOUNTER — HOSPITAL ENCOUNTER (EMERGENCY)
Age: 25
Discharge: HOME | End: 2020-07-16
Attending: EMERGENCY MEDICINE
Payer: COMMERCIAL

## 2020-07-16 VITALS
RESPIRATION RATE: 20 BRPM | WEIGHT: 155 LBS | OXYGEN SATURATION: 98 % | HEART RATE: 123 BPM | TEMPERATURE: 98.5 F | DIASTOLIC BLOOD PRESSURE: 78 MMHG | SYSTOLIC BLOOD PRESSURE: 125 MMHG | HEIGHT: 68 IN | BODY MASS INDEX: 23.49 KG/M2

## 2020-07-16 PROCEDURE — 99283 EMERGENCY DEPT VISIT LOW MDM: CPT

## 2020-07-16 RX ORDER — ESCITALOPRAM OXALATE 10 MG/1
5 TABLET ORAL DAILY
COMMUNITY
End: 2020-12-04 | Stop reason: ALTCHOICE

## 2020-07-16 ASSESSMENT — ENCOUNTER SYMPTOMS
GASTROINTESTINAL NEGATIVE: 1
RESPIRATORY NEGATIVE: 1
EYES NEGATIVE: 1

## 2020-07-16 NOTE — ED PROVIDER NOTES
The history is provided by the patient. Mental Health Problem     Patient States she went to bed around 2300 and started feeling anxious around 2330.  Throughout the night she cannot sleep because she is thinking about cardiology appt tomorrow and thinking what she needs to tell her doctor causing more anxiety. José Fried is continuously talking and does not let up, so anxious.  Worried about getting sleep to work in AM. [employer] she was scrolling on phone, getting drowsy but then cannot sleep when she puts phone down.  Started Lexapro 2 days ago.    Review of Systems   Constitutional: Negative. HENT: Negative. Eyes: Negative. Respiratory: Negative. Cardiovascular: Negative. Gastrointestinal: Negative. Genitourinary: Negative. Musculoskeletal: Negative. Skin: Negative. Neurological: Negative. All other systems reviewed and are negative.       Family History   Problem Relation Age of Onset    Colon Cancer Maternal Grandmother     Cancer Maternal Grandfather     Heart Disease Paternal Grandfather     Heart Attack Paternal Grandfather     Breast Cancer Neg Hx      Social History     Socioeconomic History    Marital status: Single     Spouse name: Not on file    Number of children: Not on file    Years of education: Not on file    Highest education level: Not on file   Occupational History    Not on file   Social Needs    Financial resource strain: Not on file    Food insecurity     Worry: Not on file     Inability: Not on file   [employer] needs     Medical: Not on file     Non-medical: Not on file   Tobacco Use    Smoking status: Never Smoker    Smokeless tobacco: Never Used   Substance and Sexual Activity    Alcohol use: Not Currently     Comment: occasionally    Drug use: No    Sexual activity: Never     Partners: Male   Lifestyle    Physical activity     Days per week: Not on file     Minutes per session: Not on file    Stress: Not on file   Relationships    Social connections     Talks on phone: Not on file     Gets together: Not on file     Attends Islam service: Not on file     Active member of club or organization: Not on file     Attends meetings of clubs or organizations: Not on file     Relationship status: Not on file    Intimate partner violence     Fear of current or ex partner: Not on file     Emotionally abused: Not on file     Physically abused: Not on file     Forced sexual activity: Not on file   Other Topics Concern    Not on file   Social History Narrative    Not on file     Past Surgical History:   Procedure Laterality Date    ESOPHAGOSCOPY      WISDOM TOOTH EXTRACTION       Past Medical History:   Diagnosis Date    Generalized anxiety disorder 3/4/2014    Did not tolerate bupar at 15mg BID, does well at 7.5mg BID. Off meds now, however.  Ovarian cyst     Palpitations 4/15/2014    Neg w/u with cardio, see chart.  Panic attacks     Pre-syncope 4/15/2014    Neg w/u with cardio, see chart.  PVC (premature ventricular contraction)      No Known Allergies  Prior to Admission medications    Medication Sig Start Date End Date Taking? Authorizing Provider   escitalopram (LEXAPRO) 10 MG tablet Take 5 mg by mouth daily x2 days then increase to 10mg   Yes Historical Provider, MD   hydrOXYzine (ATARAX) 25 MG tablet Take 25 mg by mouth 3 times daily as needed for Itching    Historical Provider, MD       /78   Pulse 123   Temp 98.5 °F (36.9 °C) (Oral)   Resp 20   Ht 5' 7.5\" (1.715 m)   Wt 155 lb (70.3 kg)   LMP 06/19/2020 (Exact Date)   SpO2 98%   BMI 23.92 kg/m²     Physical Exam  Vitals signs reviewed. Constitutional:       Appearance: She is well-developed. HENT:      Head: Normocephalic and atraumatic. Right Ear: External ear normal.      Left Ear: External ear normal.      Nose: Nose normal.   Eyes:      Conjunctiva/sclera: Conjunctivae normal.      Pupils: Pupils are equal, round, and reactive to light.    Neck: Musculoskeletal: Normal range of motion and neck supple. Cardiovascular:      Rate and Rhythm: Normal rate and regular rhythm. Heart sounds: Normal heart sounds. Pulmonary:      Effort: Pulmonary effort is normal.      Breath sounds: Normal breath sounds. Abdominal:      General: Bowel sounds are normal.      Palpations: Abdomen is soft. Musculoskeletal: Normal range of motion. Skin:     General: Skin is warm and dry. Neurological:      Mental Status: She is alert and oriented to person, place, and time. GCS: GCS eye subscore is 4. GCS verbal subscore is 5. GCS motor subscore is 6. Psychiatric:         Mood and Affect: Mood normal.         Behavior: Behavior normal.         Thought Content: Thought content normal.         Judgment: Judgment normal.         MDM:    No results found for this visit on 07/16/20. No need to work up. She has appointment today. She has calmed down and she is asymptomatic  My typical dicussion, presentation,and considerations for this patients' chief complaint, diagnosis, differential diagnosis, medications, medication use,  medication safety and medication interactions have been explained and outlined to this patient for thispatient encounter. I have stressed need for follow up and reexamination for this encounter and or return to the emergency department if any changes or any concern. Final Impression    1. Anxiety attack    2.  Panic attack              MODESTO Young DO  07/16/20 0431

## 2020-07-17 ENCOUNTER — CARE COORDINATION (OUTPATIENT)
Dept: OTHER | Facility: CLINIC | Age: 25
End: 2020-07-17

## 2020-07-17 NOTE — CARE COORDINATION
ACM attempted to reach patient for ED follow up call and introduction to [facility]  HIPAA compliant message left requesting a return phone call at patients convenience. Will continue to follow.

## 2020-07-17 NOTE — CARE COORDINATION
ACM contacted patient for introduction to [facility] Program. Patient declines care management at this time, states she finally has all of the providers in place, has a strong support system, and is stabilized on medication. [facility] provided contact information for self referral if patient would need care management in the future. ACM will sign off at this time.

## 2020-08-07 ENCOUNTER — HOSPITAL ENCOUNTER (OUTPATIENT)
Dept: NON INVASIVE DIAGNOSTICS | Age: 25
Discharge: HOME | End: 2020-08-07
Payer: COMMERCIAL

## 2020-08-07 VITALS
HEART RATE: 92 BPM | WEIGHT: 155 LBS | BODY MASS INDEX: 24.33 KG/M2 | DIASTOLIC BLOOD PRESSURE: 67 MMHG | HEIGHT: 67 IN | SYSTOLIC BLOOD PRESSURE: 115 MMHG

## 2020-08-07 LAB
LV EF: 60 %
LVEF MODALITY: NORMAL

## 2020-08-07 PROCEDURE — 93306 TTE W/DOPPLER COMPLETE: CPT

## 2020-08-25 ENCOUNTER — HOSPITAL ENCOUNTER (OUTPATIENT)
Age: 25
Discharge: HOME | End: 2020-08-25
Payer: COMMERCIAL

## 2020-08-25 LAB
ALBUMIN SERPL-MCNC: 4.7 GM/DL (ref 3.4–5)
ALP BLD-CCNC: 42 IU/L (ref 40–129)
ALT SERPL-CCNC: 13 U/L (ref 10–40)
ANION GAP SERPL CALCULATED.3IONS-SCNC: 18 MMOL/L (ref 4–16)
AST SERPL-CCNC: 15 IU/L (ref 15–37)
BASOPHILS ABSOLUTE: 0.1 K/CU MM
BASOPHILS RELATIVE PERCENT: 0.9 % (ref 0–1)
BILIRUB SERPL-MCNC: 0.4 MG/DL (ref 0–1)
BUN BLDV-MCNC: 9 MG/DL (ref 6–23)
CALCIUM SERPL-MCNC: 9.9 MG/DL (ref 8.3–10.6)
CHLORIDE BLD-SCNC: 102 MMOL/L (ref 99–110)
CO2: 20 MMOL/L (ref 21–32)
CREAT SERPL-MCNC: 0.7 MG/DL (ref 0.6–1.1)
DIFFERENTIAL TYPE: ABNORMAL
EOSINOPHILS ABSOLUTE: 0.2 K/CU MM
EOSINOPHILS RELATIVE PERCENT: 3.2 % (ref 0–3)
GFR AFRICAN AMERICAN: >60 ML/MIN/1.73M2
GFR NON-AFRICAN AMERICAN: >60 ML/MIN/1.73M2
GLUCOSE FASTING: 85 MG/DL (ref 70–99)
HCT VFR BLD CALC: 40.4 % (ref 37–47)
HEMOGLOBIN: 12.9 GM/DL (ref 12.5–16)
IMMATURE NEUTROPHIL %: 0.3 % (ref 0–0.43)
LYMPHOCYTES ABSOLUTE: 2.5 K/CU MM
LYMPHOCYTES RELATIVE PERCENT: 42.2 % (ref 24–44)
MAGNESIUM: 2 MG/DL (ref 1.8–2.4)
MCH RBC QN AUTO: 29.1 PG (ref 27–31)
MCHC RBC AUTO-ENTMCNC: 31.9 % (ref 32–36)
MCV RBC AUTO: 91.2 FL (ref 78–100)
MONOCYTES ABSOLUTE: 0.3 K/CU MM
MONOCYTES RELATIVE PERCENT: 5.8 % (ref 0–4)
PDW BLD-RTO: 13.2 % (ref 11.7–14.9)
PLATELET # BLD: 323 K/CU MM (ref 140–440)
PMV BLD AUTO: 9.4 FL (ref 7.5–11.1)
POTASSIUM SERPL-SCNC: 4 MMOL/L (ref 3.5–5.1)
RBC # BLD: 4.43 M/CU MM (ref 4.2–5.4)
SEGMENTED NEUTROPHILS ABSOLUTE COUNT: 2.8 K/CU MM
SEGMENTED NEUTROPHILS RELATIVE PERCENT: 47.6 % (ref 36–66)
SODIUM BLD-SCNC: 140 MMOL/L (ref 135–145)
T4 FREE: 1.28 NG/DL (ref 0.9–1.8)
TOTAL IMMATURE NEUTOROPHIL: 0.02 K/CU MM
TOTAL PROTEIN: 7.4 GM/DL (ref 6.4–8.2)
TSH HIGH SENSITIVITY: 2.21 UIU/ML (ref 0.27–4.2)
VITAMIN D 25-HYDROXY: 46.92 NG/ML
WBC # BLD: 5.9 K/CU MM (ref 4–10.5)

## 2020-08-25 PROCEDURE — 83735 ASSAY OF MAGNESIUM: CPT

## 2020-08-25 PROCEDURE — 84443 ASSAY THYROID STIM HORMONE: CPT

## 2020-08-25 PROCEDURE — 82306 VITAMIN D 25 HYDROXY: CPT

## 2020-08-25 PROCEDURE — 84439 ASSAY OF FREE THYROXINE: CPT

## 2020-08-25 PROCEDURE — 36415 COLL VENOUS BLD VENIPUNCTURE: CPT

## 2020-08-25 PROCEDURE — 80053 COMPREHEN METABOLIC PANEL: CPT

## 2020-08-25 PROCEDURE — 85025 COMPLETE CBC W/AUTO DIFF WBC: CPT

## 2020-11-12 ENCOUNTER — OFFICE VISIT (OUTPATIENT)
Dept: FAMILY MEDICINE CLINIC | Age: 25
End: 2020-11-12
Payer: COMMERCIAL

## 2020-11-12 VITALS
WEIGHT: 160.2 LBS | SYSTOLIC BLOOD PRESSURE: 118 MMHG | BODY MASS INDEX: 25.09 KG/M2 | TEMPERATURE: 98.2 F | DIASTOLIC BLOOD PRESSURE: 72 MMHG | HEART RATE: 88 BPM | RESPIRATION RATE: 16 BRPM

## 2020-11-12 LAB — STREPTOCOCCUS A RNA: NEGATIVE

## 2020-11-12 PROCEDURE — 99213 OFFICE O/P EST LOW 20 MIN: CPT | Performed by: NURSE PRACTITIONER

## 2020-11-12 PROCEDURE — 87651 STREP A DNA AMP PROBE: CPT | Performed by: NURSE PRACTITIONER

## 2020-11-12 ASSESSMENT — ENCOUNTER SYMPTOMS
COUGH: 0
VOMITING: 0
DIARRHEA: 1
EYE PAIN: 0
CONSTIPATION: 0
WHEEZING: 0
ABDOMINAL PAIN: 0
SINUS PAIN: 0
SHORTNESS OF BREATH: 0
NAUSEA: 0
SINUS PRESSURE: 1
CHEST TIGHTNESS: 0
SORE THROAT: 1
BACK PAIN: 0
PHOTOPHOBIA: 0
TROUBLE SWALLOWING: 0
BLOOD IN STOOL: 0
RHINORRHEA: 1

## 2020-11-12 NOTE — PATIENT INSTRUCTIONS
Patient Education        Sore Throat: Care Instructions  Your Care Instructions     Infection by bacteria or a virus causes most sore throats. Cigarette smoke, dry air, air pollution, allergies, and yelling can also cause a sore throat. Sore throats can be painful and annoying. Fortunately, most sore throats go away on their own. If you have a bacterial infection, your doctor may prescribe antibiotics. Follow-up care is a key part of your treatment and safety. Be sure to make and go to all appointments, and call your doctor if you are having problems. It's also a good idea to know your test results and keep a list of the medicines you take. How can you care for yourself at home? · If your doctor prescribed antibiotics, take them as directed. Do not stop taking them just because you feel better. You need to take the full course of antibiotics. · Gargle with warm salt water once an hour to help reduce swelling and relieve discomfort. Use 1 teaspoon of salt mixed in 1 cup of warm water. · Take an over-the-counter pain medicine, such as acetaminophen (Tylenol), ibuprofen (Advil, Motrin), or naproxen (Aleve). Read and follow all instructions on the label. · Be careful when taking over-the-counter cold or flu medicines and Tylenol at the same time. Many of these medicines have acetaminophen, which is Tylenol. Read the labels to make sure that you are not taking more than the recommended dose. Too much acetaminophen (Tylenol) can be harmful. · Drink plenty of fluids. Fluids may help soothe an irritated throat. Hot fluids, such as tea or soup, may help decrease throat pain. · Use over-the-counter throat lozenges to soothe pain. Regular cough drops or hard candy may also help. These should not be given to young children because of the risk of choking. · Do not smoke or allow others to smoke around you. If you need help quitting, talk to your doctor about stop-smoking programs and medicines.  These can increase your chances of quitting for good. · Use a vaporizer or humidifier to add moisture to your bedroom. Follow the directions for cleaning the machine. When should you call for help? Call your doctor now or seek immediate medical care if:    · You have new or worse trouble swallowing.     · Your sore throat gets much worse on one side. Watch closely for changes in your health, and be sure to contact your doctor if you do not get better as expected. Where can you learn more? Go to [email or web address] org and sign in to your [employer] account. Enter C671 in the [employer] box to learn more about \"Sore Throat: Care Instructions. \"     If you do not have an account, please click on the \"Sign Up Now\" link. Current as of: April 15, 2020               Content Version: 12.6  © 9398-7792 [employer]. Care instructions adapted under license by [employer] ([facility]). If you have questions about a medical condition or this instruction, always ask your healthcare professional. [address] any warranty or liability for your use of this information. Patient Education         Sore Throat: Here's Help (01:11)  Your health professional recommends that you watch this short online health video. Take a minute to learn about a sore throat and what you can do to feel better. How to watch the video    Scan the QR code   OR Visit the website    https://hwi. se/r/L3apmxwq6aqsq   Current as of: June 26, 2019               Content Version: 12.6  © 2006-2020 [employer]. Care instructions adapted under license by [employer] ([facility]). If you have questions about a medical condition or this instruction, always ask your healthcare professional. [address] any warranty or liability for your use of this information.

## 2020-11-12 NOTE — PROGRESS NOTES
11/12/20    Chief Complaint   Patient presents with    Fatigue     Pt here for fatigue, sore throat, general weakness, and headache       Isela Garcia, (1995), is a 30 y.o. female, is here for evaluation of the following medical concerns:    HPI    She is being seen as a new patient acute visit today. PCP is in [address] [place] [address]: Refugia Grade      Fatigue/Pharyngitis/Sinus Congestion/Headache:  Sunday began with sore throat, dry throat, ear pain, runny nose, congestion, frontal headache, clear drainage. Now feeling very fatigued, sleeping more, sore throat still last night. Today \"I feels super drained\". I have bad anxiety and panic attacks so I also got anxious. Denies fever, cough, body aches, change in smell or taste, n/v/. Some loose stools this morning. -Appetite is good, thought I was better on Wednesday. Drinking fluids well. -COVID testing completed on Monday through [address] and was told she was negative and RTW today. Review of Systems   Constitutional: Positive for fatigue. Negative for activity change, appetite change, chills, diaphoresis, fever and unexpected weight change. HENT: Positive for postnasal drip, rhinorrhea, sinus pressure and sore throat. Negative for congestion, dental problem, ear pain, hearing loss, mouth sores, nosebleeds, sinus pain, tinnitus and trouble swallowing. Eyes: Negative for photophobia, pain and visual disturbance. Respiratory: Negative for cough, chest tightness, shortness of breath and wheezing. Cardiovascular: Negative for chest pain, palpitations and leg swelling. Gastrointestinal: Positive for diarrhea. Negative for abdominal pain, blood in stool, constipation, nausea and vomiting. Endocrine: Negative for cold intolerance, heat intolerance, polydipsia and polyuria. Genitourinary: Negative for difficulty urinating, dysuria, flank pain, frequency, hematuria and urgency.    Musculoskeletal: Negative for arthralgias, back pain, gait problem, joint swelling, myalgias, neck pain and neck stiffness. Skin: Negative for pallor, rash and wound. Allergic/Immunologic: Negative for environmental allergies, food allergies and immunocompromised state. Neurological: Negative for dizziness, syncope, weakness, light-headedness, numbness and headaches. Hematological: Negative for adenopathy. Does not bruise/bleed easily. Psychiatric/Behavioral: Negative for confusion, decreased concentration, self-injury, sleep disturbance and suicidal ideas. The patient is nervous/anxious. Prior to Visit Medications    Medication Sig Taking? Authorizing Provider   escitalopram (LEXAPRO) 10 MG tablet Take 5 mg by mouth daily x2 days then increase to 10mg  Historical Provider, MD   hydrOXYzine (ATARAX) 25 MG tablet Take 25 mg by mouth 3 times daily as needed for Itching  Historical Provider, MD        No Known Allergies    Past Medical History:   Diagnosis Date    Generalized anxiety disorder 3/4/2014    Did not tolerate bupar at 15mg BID, does well at 7.5mg BID. Off meds now, however.  Ovarian cyst     Palpitations 4/15/2014    Neg w/u with cardio, see chart.  Panic attacks     Pre-syncope 4/15/2014    Neg w/u with cardio, see chart.       PVC (premature ventricular contraction)        Past Surgical History:   Procedure Laterality Date    ESOPHAGOSCOPY      WISDOM TOOTH EXTRACTION         Social History     Tobacco Use    Smoking status: Never Smoker    Smokeless tobacco: Never Used   Substance Use Topics    Alcohol use: Not Currently     Comment: occasionally    Drug use: No       Family History   Problem Relation Age of Onset    Colon Cancer Maternal Grandmother     Cancer Maternal Grandfather     Heart Disease Paternal Grandfather     Heart Attack Paternal Grandfather     Breast Cancer Neg Hx        Lab Results   Component Value Date    WBC 5.9 08/25/2020    HGB 12.9 08/25/2020    HCT 40.4 08/25/2020    MCV 91.2 08/25/2020  08/25/2020     Lab Results   Component Value Date    LABA1C 5.1 10/14/2015     Lab Results   Component Value Date    TSH 3.560 04/08/2015     Lab Results   Component Value Date    HDL 68 10/05/2019         Results for orders placed or performed in visit on 11/12/20   POCT Rapid Strep A DNA (Alere i)   Result Value Ref Range    Streptococcus A RNA Negative        Wt Readings from Last 3 Encounters:   11/12/20 160 lb 3.2 oz (72.7 kg)   08/07/20 155 lb (70.3 kg)   07/16/20 155 lb (70.3 kg)     . FLOWAMB[6   BP Readings from Last 3 Encounters:   11/12/20 118/72   08/07/20 115/67   07/16/20 125/78     Pulse Readings from Last 3 Encounters:   11/12/20 88   08/07/20 92   07/16/20 123        Physical Exam  Vitals signs and nursing note reviewed. Constitutional:       General: She is not in acute distress. Appearance: Normal appearance. She is not ill-appearing, toxic-appearing or diaphoretic. HENT:      Head: Normocephalic and atraumatic. Right Ear: Tympanic membrane, ear canal and external ear normal.      Left Ear: Tympanic membrane, ear canal and external ear normal.      Nose: Nose normal. No congestion or rhinorrhea. Mouth/Throat:      Mouth: Mucous membranes are moist.      Pharynx: Oropharynx is clear. Posterior oropharyngeal erythema present. No oropharyngeal exudate. Eyes:      Extraocular Movements: Extraocular movements intact. Conjunctiva/sclera: Conjunctivae normal.      Pupils: Pupils are equal, round, and reactive to light. Neck:      Musculoskeletal: Normal range of motion and neck supple. No neck rigidity or muscular tenderness. Cardiovascular:      Rate and Rhythm: Normal rate and regular rhythm. Pulses: Normal pulses. Heart sounds: Normal heart sounds. Pulmonary:      Effort: Pulmonary effort is normal. No respiratory distress. Breath sounds: Normal breath sounds. No stridor. No wheezing, rhonchi or rales. Chest:      Chest wall: No tenderness. Abdominal:      General: Abdomen is flat. Bowel sounds are normal. There is no distension. Palpations: Abdomen is soft. There is no mass. Tenderness: There is no abdominal tenderness. There is no right CVA tenderness, left CVA tenderness or guarding. Hernia: No hernia is present. Musculoskeletal: Normal range of motion. General: No swelling or tenderness. Right lower leg: No edema. Left lower leg: No edema. Lymphadenopathy:      Cervical: No cervical adenopathy. Skin:     General: Skin is warm and dry. Capillary Refill: Capillary refill takes less than 2 seconds. Coloration: Skin is not pale. Findings: No bruising, erythema, lesion or rash. Neurological:      General: No focal deficit present. Mental Status: She is alert and oriented to person, place, and time. Motor: No weakness. Coordination: Coordination normal.      Gait: Gait normal.   Psychiatric:         Attention and Perception: Attention and perception normal.         Mood and Affect: Mood is anxious. Speech: Speech normal.         Behavior: Behavior normal. Behavior is cooperative. Thought Content: Thought content normal.         Judgment: Judgment normal.         ASSESSMENT AND PLAN:    1. Acute pharyngitis, unspecified etiology  Rapid strep negative, and told by [facility] that her COVID-19 was negative and was RTW. No acute distress. Education provided on viral illness. - Encourage clear fluids without caffeine to ensure hydration.  - Warm salt water gurgles to soothe throat. - May use spoonfuls of honey to coat throat. - Rest voice. - Tylenol as needed for fever, pain. - Counseled on signs of increased work of breathing.   - RTO if sxs increase or no improvement.   - POCT Rapid Strep A DNA (Alere i)    2. Viral illness:  Same as above. Return if symptoms worsen or fail to improve.     Alfredia Jeans, APRN - CNP

## 2020-12-04 ENCOUNTER — HOSPITAL ENCOUNTER (EMERGENCY)
Age: 25
Discharge: HOME | End: 2020-12-04
Attending: EMERGENCY MEDICINE
Payer: COMMERCIAL

## 2020-12-04 VITALS
SYSTOLIC BLOOD PRESSURE: 115 MMHG | DIASTOLIC BLOOD PRESSURE: 74 MMHG | HEART RATE: 82 BPM | OXYGEN SATURATION: 100 % | TEMPERATURE: 98.4 F | BODY MASS INDEX: 25.11 KG/M2 | RESPIRATION RATE: 17 BRPM | HEIGHT: 67 IN | WEIGHT: 160 LBS

## 2020-12-04 LAB
ALBUMIN SERPL-MCNC: 4.7 GM/DL (ref 3.4–5)
ALP BLD-CCNC: 38 IU/L (ref 40–129)
ALT SERPL-CCNC: 12 U/L (ref 10–40)
AMPHETAMINES: NEGATIVE
ANION GAP SERPL CALCULATED.3IONS-SCNC: 11 MMOL/L (ref 4–16)
AST SERPL-CCNC: 12 IU/L (ref 15–37)
BACTERIA: ABNORMAL /HPF
BARBITURATE SCREEN URINE: NEGATIVE
BASOPHILS ABSOLUTE: 0 K/CU MM
BASOPHILS RELATIVE PERCENT: 0.4 % (ref 0–1)
BENZODIAZEPINE SCREEN, URINE: NEGATIVE
BILIRUB SERPL-MCNC: 0.3 MG/DL (ref 0–1)
BILIRUBIN URINE: NEGATIVE MG/DL
BLOOD, URINE: NEGATIVE
BUN BLDV-MCNC: 13 MG/DL (ref 6–23)
CALCIUM SERPL-MCNC: 9.5 MG/DL (ref 8.3–10.6)
CANNABINOID SCREEN URINE: NEGATIVE
CAST TYPE: NEGATIVE /HPF
CHLORIDE BLD-SCNC: 102 MMOL/L (ref 99–110)
CLARITY: ABNORMAL
CO2: 26 MMOL/L (ref 21–32)
COCAINE METABOLITE: NEGATIVE
COLOR: YELLOW
CREAT SERPL-MCNC: 0.7 MG/DL (ref 0.6–1.1)
CRYSTAL TYPE: NEGATIVE /HPF
DIFFERENTIAL TYPE: ABNORMAL
EOSINOPHILS ABSOLUTE: 0.1 K/CU MM
EOSINOPHILS RELATIVE PERCENT: 1.8 % (ref 0–3)
EPITHELIAL CELLS, UA: ABNORMAL /HPF
GFR AFRICAN AMERICAN: >60 ML/MIN/1.73M2
GFR NON-AFRICAN AMERICAN: >60 ML/MIN/1.73M2
GLUCOSE BLD-MCNC: 97 MG/DL (ref 70–99)
GLUCOSE, URINE: NEGATIVE MG/DL
HCT VFR BLD CALC: 37.9 % (ref 37–47)
HEMOGLOBIN: 12.5 GM/DL (ref 12.5–16)
IMMATURE NEUTROPHIL %: 0.3 % (ref 0–0.43)
INTERPRETATION: NORMAL
KETONES, URINE: NEGATIVE MG/DL
LEUKOCYTE ESTERASE, URINE: NEGATIVE
LYMPHOCYTES ABSOLUTE: 2.2 K/CU MM
LYMPHOCYTES RELATIVE PERCENT: 27.9 % (ref 24–44)
MCH RBC QN AUTO: 29.6 PG (ref 27–31)
MCHC RBC AUTO-ENTMCNC: 33 % (ref 32–36)
MCV RBC AUTO: 89.6 FL (ref 78–100)
MONOCYTES ABSOLUTE: 0.4 K/CU MM
MONOCYTES RELATIVE PERCENT: 5 % (ref 0–4)
NITRITE URINE, QUANTITATIVE: NEGATIVE
OPIATES, URINE: NEGATIVE
OXYCODONE: NEGATIVE
PDW BLD-RTO: 13.3 % (ref 11.7–14.9)
PH, URINE: 6.5 (ref 5–8)
PHENCYCLIDINE, URINE: NEGATIVE
PLATELET # BLD: 275 K/CU MM (ref 140–440)
PMV BLD AUTO: 9.3 FL (ref 7.5–11.1)
POTASSIUM SERPL-SCNC: 3.8 MMOL/L (ref 3.5–5.1)
PREGNANCY, URINE: NEGATIVE
PROTEIN UA: NEGATIVE MG/DL
RBC # BLD: 4.23 M/CU MM (ref 4.2–5.4)
RBC URINE: NEGATIVE /HPF (ref 0–6)
SEGMENTED NEUTROPHILS ABSOLUTE COUNT: 5 K/CU MM
SEGMENTED NEUTROPHILS RELATIVE PERCENT: 64.6 % (ref 36–66)
SODIUM BLD-SCNC: 139 MMOL/L (ref 135–145)
SPECIFIC GRAVITY UA: 1.01 (ref 1–1.03)
SPECIFIC GRAVITY, URINE: 1.01 (ref 1–1.03)
TOTAL IMMATURE NEUTOROPHIL: 0.02 K/CU MM
TOTAL PROTEIN: 7 GM/DL (ref 6.4–8.2)
TROPONIN T: <0.01 NG/ML
UROBILINOGEN, URINE: 0.2 MG/DL (ref 0.2–1)
WBC # BLD: 7.8 K/CU MM (ref 4–10.5)
WBC UA: NEGATIVE /HPF (ref 0–5)

## 2020-12-04 PROCEDURE — 80307 DRUG TEST PRSMV CHEM ANLYZR: CPT

## 2020-12-04 PROCEDURE — 81025 URINE PREGNANCY TEST: CPT

## 2020-12-04 PROCEDURE — 93005 ELECTROCARDIOGRAM TRACING: CPT | Performed by: EMERGENCY MEDICINE

## 2020-12-04 PROCEDURE — 81001 URINALYSIS AUTO W/SCOPE: CPT

## 2020-12-04 PROCEDURE — 80053 COMPREHEN METABOLIC PANEL: CPT

## 2020-12-04 PROCEDURE — 99285 EMERGENCY DEPT VISIT HI MDM: CPT

## 2020-12-04 PROCEDURE — 85025 COMPLETE CBC W/AUTO DIFF WBC: CPT

## 2020-12-04 PROCEDURE — 84484 ASSAY OF TROPONIN QUANT: CPT

## 2020-12-04 RX ORDER — 0.9 % SODIUM CHLORIDE 0.9 %
1000 INTRAVENOUS SOLUTION INTRAVENOUS ONCE
Status: DISCONTINUED | OUTPATIENT
Start: 2020-12-04 | End: 2020-12-04 | Stop reason: HOSPADM

## 2020-12-04 NOTE — ED PROVIDER NOTES
hematemesis, hematochezia, or melena. GENITOURINARY: No frequency, urgency, or dysuria. No hematuria. MUSCULOSKELETAL: No recent injury. No neck, back, or extremity pain. NEUROLOGICAL: No focal weakness, numbness, or tingling. SKIN: No rashes or other lesions reported. No yellowing of the skin. Past Medical History:   Diagnosis Date    Generalized anxiety disorder 3/4/2014    Did not tolerate bupar at 15mg BID, does well at 7.5mg BID. Off meds now, however.  Ovarian cyst     Palpitations 4/15/2014    Neg w/u with cardio, see chart.  Panic attacks     Pre-syncope 4/15/2014    Neg w/u with cardio, see chart.       PVC (premature ventricular contraction)      Past Surgical History:   Procedure Laterality Date    ESOPHAGOSCOPY      WISDOM TOOTH EXTRACTION       Family History   Problem Relation Age of Onset    Colon Cancer Maternal Grandmother     Cancer Maternal Grandfather     Heart Disease Paternal Grandfather     Heart Attack Paternal Grandfather     Breast Cancer Neg Hx      Social History     Socioeconomic History    Marital status: Single     Spouse name: Not on file    Number of children: Not on file    Years of education: Not on file    Highest education level: Not on file   Occupational History    Not on file   Social Needs    Financial resource strain: Not on file    Food insecurity     Worry: Not on file     Inability: Not on file   [employer] needs     Medical: Not on file     Non-medical: Not on file   Tobacco Use    Smoking status: Never Smoker    Smokeless tobacco: Never Used   Substance and Sexual Activity    Alcohol use: Yes     Comment: occasionally    Drug use: No    Sexual activity: Never     Partners: Male   Lifestyle    Physical activity     Days per week: Not on file     Minutes per session: Not on file    Stress: Not on file   Relationships    Social connections     Talks on phone: Not on file     Gets together: Not on file     Attends Jew service: Not on file     Active member of club or organization: Not on file     Attends meetings of clubs or organizations: Not on file     Relationship status: Not on file    Intimate partner violence     Fear of current or ex partner: Not on file     Emotionally abused: Not on file     Physically abused: Not on file     Forced sexual activity: Not on file   Other Topics Concern    Not on file   Social History Narrative    Not on file     No current facility-administered medications for this encounter. Current Outpatient Medications   Medication Sig Dispense Refill    hydrOXYzine (ATARAX) 25 MG tablet Take 25 mg by mouth 3 times daily as needed for Itching       No Known Allergies    Nursing Notes Reviewed    Physical Exam:  Triage VS:    ED Triage Vitals [12/04/20 1502]   Enc Vitals Group      /84      Pulse 109      Resp 16      Temp 98.4 °F (36.9 °C)      Temp Source Oral      SpO2 100 %      Weight 160 lb (72.6 kg)      Height 5' 7\" (1.702 m)      Head Circumference       Peak Flow       Pain Score       Pain Loc       Pain Edu? Excl. in [address]? My pulse ox interpretation is - normal    GENERAL: Patient is awake, alert, and oriented appropriately. Patient is resting comfortably in a still position on the exam table. Patient speaking in full and complete sentences. Well-nourished and well-developed. HEENT: Normocephalic and atraumatic. No midface, zygomatic, maxillary, or mandibular tenderness. No dental malocclusion. Bilateral external ears are unremarkable. Tympanic membranes are pearly and gray without visible effusion or retraction. Nasal mucosa is pink without purulence. Oral mucosa is moist and pink. NECK: Supple without Kernig's or Brudzinski signs. No significant lymphadenopathy or limitation range of motion. No midline spinal tenderness. RESPIRATORY: Symmetric aeration bilaterally. No audible wheezes, rales, rhonchi, or stridor.   No chest wall Gap 11 4 - 16   Troponin   Result Value Ref Range    Troponin T <0.010 <0.01 NG/ML   EKG 12 Lead   Result Value Ref Range    Ventricular Rate 94 BPM    Atrial Rate 94 BPM    P-R Interval 136 ms    QRS Duration 74 ms    Q-T Interval 366 ms    QTc Calculation (Bazett) 457 ms    P Axis 83 degrees    R Axis 74 degrees    T Axis 64 degrees    Diagnosis       Normal sinus rhythm  Normal ECG  When compared with ECG of 03-JAN-2020 14:58,  Vent. rate has decreased BY  61 BPM          Radiographs (if obtained):  None indicated. EKG (if obtained): (All EKG's are interpreted by myself in the absence of a cardiologist)  Twelve-lead EKG interpreted by me in the absence of a cardiologist.  There is no criteria ST elevation or reciprocal change. There are no hyperacute T wave changes. There is no sign of acute ischemia or infarction. This tracing shows a normal sinus rhythm. Rate and intervals are 94 beats per minute, SC interval 136 milliseconds, QRS duration 74 milliseconds, QTc interval 457 milliseconds, and R axis normal at 74 degrees. There is no acute change compared with the most recent EKG except for rate and rhythm of 156 suggestive of SVT. Medical decision making:  Patient presents to the emergency department with what is essentially recurrence of palpitations. She reports having a history of PVCs, which is entirely possible for today, though they have not been specifically documented. There has been no documentation of ventricular tachycardia, fibrillation, atrial fibrillation or flutter, or SVT. A prior EKG is suspicious for SVT. Patient appears generally well hydrated and nontoxic. There is no evidence of gross thyroid, renal, hepatic, or cardiac dysfunction. I doubt acute coronary syndrome. Patient appears sufficiently low risk for venous thromboembolic disease. Abdomen is nonsurgical.  Certainly consider a contribution from anxiety. There has been no respiratory distress, hypoxia, or cyanosis. Sepsis is unlikely. Procedures: None. Consultations: None. Clinical Impression:  1. Palpitations    2. PVC's (premature ventricular contractions)      Disposition referral (if applicable):  Lu Hurtado  [address] [place] [address],[address]  [phone]    Schedule an appointment as soon as possible for a visit   And your cardiologist for further follow-up    [facility]  [address]  [phone]  Go to   As needed, If symptoms worsen    Disposition medications (if applicable):  Discharge Medication List as of 12/4/2020  6:36 PM        ED Provider Disposition Time  DISPOSITION Decision To Discharge 12/04/2020 06:35:04 PM      Comment: Please note this report has been produced using speech recognition software and may contain errors related to that system including errors in grammar, punctuation, and spelling, as well as words and phrases that may be inappropriate. Efforts were made to edit the dictations.         Haleigh Galeas MD  12/05/20 8494

## 2020-12-04 NOTE — ED NOTES
Patient refused IV. States she doesn't think she needs the IV fluids. Bloodwork drawn and sent to lab. Patient tolerated without difficulty.       Melissa Wright RN  12/04/20 5145

## 2020-12-04 NOTE — ED TRIAGE NOTES
Arrived to room   for triage. Tolerated without difficulty. Bed in lowest position. Call light given. Gowned for exam. Monitor applied.

## 2020-12-05 PROCEDURE — 93010 ELECTROCARDIOGRAM REPORT: CPT | Performed by: INTERNAL MEDICINE

## 2020-12-09 LAB
EKG ATRIAL RATE: 94 BPM
EKG DIAGNOSIS: NORMAL
EKG P AXIS: 83 DEGREES
EKG P-R INTERVAL: 136 MS
EKG Q-T INTERVAL: 366 MS
EKG QRS DURATION: 74 MS
EKG QTC CALCULATION (BAZETT): 457 MS
EKG R AXIS: 74 DEGREES
EKG T AXIS: 64 DEGREES
EKG VENTRICULAR RATE: 94 BPM

## 2021-01-01 ENCOUNTER — HOSPITAL ENCOUNTER (EMERGENCY)
Age: 26
Discharge: HOME | End: 2021-01-01
Attending: EMERGENCY MEDICINE
Payer: COMMERCIAL

## 2021-01-01 VITALS
HEART RATE: 102 BPM | WEIGHT: 155 LBS | RESPIRATION RATE: 16 BRPM | SYSTOLIC BLOOD PRESSURE: 134 MMHG | BODY MASS INDEX: 23.49 KG/M2 | TEMPERATURE: 98.4 F | HEIGHT: 68 IN | DIASTOLIC BLOOD PRESSURE: 99 MMHG | OXYGEN SATURATION: 96 %

## 2021-01-01 DIAGNOSIS — Z20.822 SUSPECTED COVID-19 VIRUS INFECTION: ICD-10-CM

## 2021-01-01 DIAGNOSIS — J06.9 UPPER RESPIRATORY TRACT INFECTION, UNSPECIFIED TYPE: Primary | ICD-10-CM

## 2021-01-01 PROCEDURE — 99284 EMERGENCY DEPT VISIT MOD MDM: CPT

## 2021-01-01 RX ORDER — FLUTICASONE PROPIONATE 50 MCG
1 SPRAY, SUSPENSION (ML) NASAL DAILY
Qty: 1 BOTTLE | Refills: 0 | Status: SHIPPED | OUTPATIENT
Start: 2021-01-01 | End: 2021-05-14

## 2021-01-01 RX ORDER — GUAIFENESIN/DEXTROMETHORPHAN 100-10MG/5
5 SYRUP ORAL 4 TIMES DAILY PRN
Qty: 120 ML | Refills: 0 | Status: SHIPPED | OUTPATIENT
Start: 2021-01-01 | End: 2021-01-11

## 2021-01-01 ASSESSMENT — PAIN SCALES - GENERAL: PAINLEVEL_OUTOF10: 7

## 2021-01-01 ASSESSMENT — PAIN DESCRIPTION - PAIN TYPE: TYPE: ACUTE PAIN

## 2021-01-01 NOTE — ED PROVIDER NOTES
Emergency Department Encounter    Patient: Mushtaq Villegas  MRN: [id number]  : 1995  Date of Evaluation: 2021  ED Provider:  Ney Daniels    Triage Chief Complaint:   Fatigue (has been sick and not feeling well since Tuesday. Lost taste of smell. has been tested with rapid and reg test and negative. ) and Nasal Congestion    [place]:  Mushtaq Villegas is a 30 y.o. female that presents with concern that she may have Covid. She has not felt well since Tuesday. Started with nasal congestion sinus pressure, headache, body aches, some sore throat. Having little bit of a cough. No shortness of breath or chest pain. Still having all of the symptoms. She also lost her sense of taste and smell over the last 24 hours. Also had some diarrhea. No abdominal pain. No nausea or vomiting. She had a rapid test the day she started having symptoms that was negative and was tested by occupational health with a send out but has not received those results. She lives alone and was getting more more anxious and concerned because she did not know if she should be taking other medications or what she could take. She has not been taking anything at home. No lightheadedness or passing out. Denies concern for pregnancy. ROS - see HPI, below listed is current ROS at time of my eval:  10 systems reviewed and negative except as above. Past Medical History:   Diagnosis Date    Generalized anxiety disorder 3/4/2014    Did not tolerate bupar at 15mg BID, does well at 7.5mg BID. Off meds now, however.  Ovarian cyst     Palpitations 4/15/2014    Neg w/u with cardio, see chart.  Panic attacks     Pre-syncope 4/15/2014    Neg w/u with cardio, see chart.       PVC (premature ventricular contraction)      Past Surgical History:   Procedure Laterality Date    ESOPHAGOSCOPY      WISDOM TOOTH EXTRACTION       Family History   Problem Relation Age of Onset    Colon Cancer Maternal Grandmother  Cancer Maternal Grandfather     Heart Disease Paternal Grandfather     Heart Attack Paternal Grandfather     Breast Cancer Neg Hx      Social History     Socioeconomic History    Marital status: Single     Spouse name: Not on file    Number of children: Not on file    Years of education: Not on file    Highest education level: Not on file   Occupational History    Not on file   Social Needs    Financial resource strain: Not on file    Food insecurity     Worry: Not on file     Inability: Not on file    Transportation needs     Medical: Not on file     Non-medical: Not on file   Tobacco Use    Smoking status: Never Smoker    Smokeless tobacco: Never Used   Substance and Sexual Activity    Alcohol use: Yes     Comment: occasionally    Drug use: No    Sexual activity: Never     Partners: Male   Lifestyle    Physical activity     Days per week: Not on file     Minutes per session: Not on file    Stress: Not on file   Relationships    Social connections     Talks on phone: Not on file     Gets together: Not on file     Attends Adventism service: Not on file     Active member of club or organization: Not on file     Attends meetings of clubs or organizations: Not on file     Relationship status: Not on file    Intimate partner violence     Fear of current or ex partner: Not on file     Emotionally abused: Not on file     Physically abused: Not on file     Forced sexual activity: Not on file   Other Topics Concern    Not on file   Social History Narrative    Not on file     No current facility-administered medications for this encounter.       Current Outpatient Medications   Medication Sig Dispense Refill    fluticasone (FLONASE) 50 MCG/ACT nasal spray 1 spray by Each Nostril route daily 1 Bottle 0    guaiFENesin-dextromethorphan (ROBITUSSIN DM) 100-10 MG/5ML syrup Take 5 mLs by mouth 4 times daily as needed for Cough 120 mL 0

## 2021-01-01 NOTE — ED NOTES
Discharge instructions given to pt. Scripts sent electronically to [facility]. Instructed what they are for and instructed to go to [facility] to pick them up. Instructed to quarantine. Instructed to take Tylenol and Ibuprofen for any body aches, pain and fever. Instructed to drink lots of fluids. Instructed to follow up with Occupational Health and tor return to ER if any problems or concerns. Pt verbalizes understanding.  Pt discharged ambulatory     Sameer Riley RN  01/01/21 3107

## 2021-01-01 NOTE — ED NOTES
Pt came in for sinus congestion and headache. States has lost her taste and smell. She states being tested for Covid with rapid and thru [facility] drive thru. Waiting results of the [facility] thru.        Kristi Mosqueda RN  01/01/21 [address] RN  01/01/21 7896

## 2021-01-02 ENCOUNTER — CARE COORDINATION (OUTPATIENT)
Dept: OTHER | Facility: CLINIC | Age: 26
End: 2021-01-02

## 2021-01-02 RX ORDER — GUAIFENESIN 400 MG/1
400 TABLET ORAL 4 TIMES DAILY PRN
COMMUNITY
End: 2021-01-17

## 2021-01-02 NOTE — CARE COORDINATION
Planning:   Does patient have an Advance Directive:  patient declined education. Patient has following risk factors of: no known risk factors. CTN/ACM reviewed discharge instructions, medical action plan and red flags such as increased shortness of breath, increasing fever and signs of decompensation with patient who verbalized understanding. Discussed exposure protocols and quarantine with CDC Guidelines What to do if you are sick with coronavirus disease 2019.  Patient was given an opportunity for questions and concerns. The patient agrees to contact the [facility] exposure line [phone], local health department [place] Department of Health: ([phone]) and PCP office for questions related to their healthcare. CTN/ACM provided contact information for future needs. Reviewed and educated patient on any new and changed medications related to discharge diagnosis     Patient/family/caregiver given information for [facility] Loop and agrees to enroll no  Patient's preferred e-mail: declines   Patient's preferred phone number: declines  Based on Loop alert triggers, patient will be contacted by nurse care manager for worsening symptoms. Plan for follow-up call in 5-7 days based on severity of symptoms and risk factors. Patient reports her symptom onset was Tuesday evening 12/29 with headache. She went to work on Wednesday when she had a headache, ear pain and congestion. She was told to call Occupational health, they sent her to be tested (awaiting results). She saw her PCP also where she got a rapid test on Wednesday that was negative. On Thursday she had loss of taste and smell onset. She is worried her test will be negative and she will need to be rested. ACM advised patient to discuss symptoms with manager and [facility]. Health Monday morning. If follow up testing is needed ACM reminded patient of [facility] policy. All questions answered and appropriate contacts provided in [facility] resource letter.  ACM team will continue outreaches to patient for COVID 19 CT follow up.      Care Transitions ED Follow Up    Care Transitions Interventions  No Identified Needs  Do you have any ongoing symptoms?: Yes   Onset of Patient-reported symptoms: Today   Patient-reported symptoms:  (Comment: see COVID assessment)   Did you call your PCP prior to going to the ED?: No - Did not call PCP   Do you have a copy of your discharge instructions?: Yes   Do you understand what to report and when to return?: Yes   Are you following your discharge instructions?: Yes   Do you have all of your prescriptions and are they filled?: Yes   Have you scheduled your follow up appointment?: No (Comment: weekend)   Were you discharged with any Home Care or Post Acute Services or do you currently have any active services?: No

## 2021-01-02 NOTE — PATIENT INSTRUCTIONS
Patient Education        Learning About Coronavirus [phone])  Coronavirus [phone]): Overview  What is coronavirus (MPAZB-32)? The coronavirus disease (COVID-19) is caused by a virus. It is an illness that was first found in December 2019. It has since spread worldwide. The virus can cause fever, cough, and trouble breathing. In severe cases, it can cause pneumonia and make it hard to breathe without help. It can cause death. This virus spreads person-to-person through droplets from coughing and sneezing. It can also spread when you are close to someone who is infected. And it can spread when you touch something that has the virus on it, such as a doorknob or a tabletop. Coronaviruses are a large group of viruses. They cause the common cold. They also cause more serious illnesses like Middle East respiratory syndrome (MERS) and severe acute respiratory syndrome (SARS). COVID-19 is caused by a novel coronavirus. That means it's a new type that has not been seen in people before. How is COVID-19 treated? Mild illness can be treated at home, but more serious illness needs to be treated in the hospital. Treatment may include medicines to reduce symptoms, plus breathing support such as oxygen therapy or a ventilator. Other treatments, such as antiviral medicines, may help people who have COVID-19. What can you do to protect yourself from COVID-19? The best way to protect yourself from getting sick is to:  · Avoid areas where there is an outbreak. · Avoid contact with people who may be infected. · Avoid crowds and try to stay at least 6 feet away from other people. · Wash your hands often, especially after you cough or sneeze. Use soap and water, and scrub for at least 20 seconds. If soap and water aren't available, use an alcohol-based hand . · Avoid touching your mouth, nose, and eyes. What can you do to avoid spreading the virus to others?   To help avoid spreading the virus to others:  · [employer] your hands often with soap or alcohol-based hand sanitizers. · Cover your mouth with a tissue when you cough or sneeze. Then throw the tissue in the trash. · Use a disinfectant to clean things that you touch often. These include doorknobs, remote controls, phones, and handles on your refrigerator and microwave. And don't forget countertops, tabletops, bathrooms, and computer keyboards. · Wear a cloth face cover if you have to go to public areas. If you know or suspect that you have COVID-19:  · Stay home. Don't go to school, work, or public areas. And don't use public transportation, ride-shares, or taxis unless you have no choice. · Leave your home only if you need to get medical care or testing. But call the doctor's office first so they know you're coming. And wear a face cover. · Limit contact with people in your home. If possible, stay in a separate bedroom and use a separate bathroom. · Wear a face cover whenever you're around other people. It can help stop the spread of the virus when you cough or sneeze. · Clean and disinfect your home every day. Use household  and disinfectant wipes or sprays. Take special care to clean things that you grab with your hands. · Self-isolate until it's safe to be around others again. ? If you have symptoms, it's safe when you haven't had a fever for 3 days and your symptoms have improved and it's been at least 10 days since your symptoms started. ? If you were exposed to the virus but don't have symptoms, it's safe to be around others 14 days after exposure. ? Talk to your doctor about whether you also need testing, especially if you have a weakened immune system. When to call for help  Call 911 anytime you think you may need emergency care. For example, call if:  · You have severe trouble breathing. (You can't talk at all.)  · You have constant chest pain or pressure. · You are severely dizzy or lightheaded.   · You are confused or can't think clearly. · Your face and lips have a blue color. · You passed out (lost consciousness) or are very hard to wake up. Call your doctor now if you develop symptoms such as:  · Shortness of breath. · Fever. · Cough. If you need to get care, call ahead to the doctor's office for instructions before you go. Make sure you wear a face cover to prevent exposing other people to the virus. Where can you get the latest information? The following health organizations are tracking and studying this virus. Their websites contain the most up-to-date information. [place] also learn what to do if you think you may have been exposed to the virus. · U.S. Centers for Disease Control and Prevention (CDC): The CDC provides updated news about the disease and travel advice. The website also tells you how to prevent the spread of infection. www.cdc.gov  · World Health Organization [facility]): WHO offers information about the virus outbreaks. WHO also has travel advice. www.who.int  Current as of: July 10, 2020               Content Version: 12.6  © 2006-2020 [employer]. Care instructions adapted under license by [facility] ([facility]). If you have questions about a medical condition or this instruction, always ask your healthcare professional. [place] [address] any warranty or liability for your use of this information.

## 2021-01-07 ENCOUNTER — CARE COORDINATION (OUTPATIENT)
Dept: OTHER | Facility: CLINIC | Age: 26
End: 2021-01-07

## 2021-01-07 NOTE — CARE COORDINATION
[facility]  Transitions Follow Up Call    2021    Patient: Bessie Woods  Patient : 1995   MRN: [id number]  Reason for Admission: upper respiratory infection, suspected COVID  Discharge Date: 21 RARS: No data recorded       Spoke with: patient  Patient contacted regarding COVID-19 risk and screening. Discussed COVID-19 related testing which was available at this time. Test results were positive. Patient informed of results, if available? Patient reported COVID test was done at [facility] and was positive 2021. Care Transition Nurse/ Ambulatory Care Manager contacted the patient by telephone to perform follow-up assessment. Verified name and  with patient as identifiers. Patient has following risk factors of: no known risk factors. Symptoms reviewed with patient who verbalized the following symptoms: fatigue, loss of taste or smell, no new symptoms, no worsening symptoms and congestion. Due to no new or worsening symptoms encounter was not routed to provider for escalation. Education provided regarding infection prevention, and signs and symptoms of COVID-19 and when to seek medical attention with parent who verbalized understanding. Discussed exposure protocols and quarantine from [address] [place] [address] [address] you at higher risk for severe illness 2019 and given an opportunity for questions and concerns. The parent agrees to contact the COVID-19 hotline [phone] or PCP office for questions related to their healthcare. CTN/ACM provided contact information for future reference. From CDC: Are you at higher risk for severe illness?  Wash your hands often.  Avoid close contact (6 feet, which is about two arm lengths) with people who are sick.  Put distance between yourself and other people if COVID-19 is spreading in your community.  Clean and disinfect frequently touched surfaces.  Avoid all cruise travel and non-essential air travel.    Call your healthcare professional if you have concerns about COVID-19 and your underlying condition or if you are sick. For more information on steps you can take to protect yourself, see CDC's How to [place] for follow-up call in 7-14 days based on severity of symptoms and risk factors. Patient states she had another COVID test on Monday at the [facility] and it was positive. Patient c/o loss of taste and smell, some nasal congestion, fatigue. Feeling much better. Patient has started taking the following supplements: vitamin C, vitamin D, multivitamin, zinc. Agrees to one more f/u call with ACM in 1 week. Care Transitions Subsequent and Final Call    Schedule Follow Up Appointment with PCP: Completed  Subsequent and Final Calls  Do you have any ongoing symptoms?: Yes  Have your medications changed?: No  Do you have any questions related to your medications?: No  Do you currently have any active services?: No  Do you have any needs or concerns that I can assist you with?: No  Identified Barriers: None  Care Transitions Interventions  No Identified Needs  Other Interventions: Follow Up  No future appointments.     Enid Bill RN

## 2021-01-13 ENCOUNTER — CARE COORDINATION (OUTPATIENT)
Dept: OTHER | Facility: CLINIC | Age: 26
End: 2021-01-13

## 2021-01-17 ENCOUNTER — HOSPITAL ENCOUNTER (EMERGENCY)
Age: 26
Discharge: HOME | End: 2021-01-17
Attending: EMERGENCY MEDICINE
Payer: COMMERCIAL

## 2021-01-17 VITALS
SYSTOLIC BLOOD PRESSURE: 115 MMHG | RESPIRATION RATE: 16 BRPM | BODY MASS INDEX: 24.33 KG/M2 | OXYGEN SATURATION: 99 % | DIASTOLIC BLOOD PRESSURE: 63 MMHG | WEIGHT: 155 LBS | TEMPERATURE: 98.5 F | HEIGHT: 67 IN | HEART RATE: 87 BPM

## 2021-01-17 DIAGNOSIS — R20.2 FACIAL TINGLING: Primary | ICD-10-CM

## 2021-01-17 DIAGNOSIS — R20.0 NUMBNESS AND TINGLING IN BOTH HANDS: ICD-10-CM

## 2021-01-17 DIAGNOSIS — R20.2 NUMBNESS AND TINGLING IN BOTH HANDS: ICD-10-CM

## 2021-01-17 PROCEDURE — 99284 EMERGENCY DEPT VISIT MOD MDM: CPT

## 2021-01-17 RX ORDER — METHYLPREDNISOLONE 4 MG/1
4 TABLET ORAL SEE ADMIN INSTRUCTIONS
COMMUNITY
Start: 2021-01-15 | End: 2021-05-14

## 2021-01-17 RX ORDER — AMOXICILLIN 875 MG/1
875 TABLET, COATED ORAL 2 TIMES DAILY
COMMUNITY
Start: 2021-01-15 | End: 2021-05-14

## 2021-01-17 ASSESSMENT — ENCOUNTER SYMPTOMS
GASTROINTESTINAL NEGATIVE: 1
RESPIRATORY NEGATIVE: 1
EYES NEGATIVE: 1

## 2021-01-18 ENCOUNTER — CARE COORDINATION (OUTPATIENT)
Dept: OTHER | Facility: CLINIC | Age: 26
End: 2021-01-18

## 2021-01-18 NOTE — ED PROVIDER NOTES
 Marital status: Single     Spouse name: Not on file    Number of children: Not on file    Years of education: Not on file    Highest education level: Not on file   Occupational History    Not on file   Social Needs    Financial resource strain: Not on file    Food insecurity     Worry: Not on file     Inability: Not on file    Transportation needs     Medical: Not on file     Non-medical: Not on file   Tobacco Use    Smoking status: Never Smoker    Smokeless tobacco: Never Used   Substance and Sexual Activity    Alcohol use: Yes     Comment: occasionally    Drug use: No    Sexual activity: Never     Partners: Male   Lifestyle    Physical activity     Days per week: Not on file     Minutes per session: Not on file    Stress: Not on file   Relationships    Social connections     Talks on phone: Not on file     Gets together: Not on file     Attends Gnosticism service: Not on file     Active member of club or organization: Not on file     Attends meetings of clubs or organizations: Not on file     Relationship status: Not on file    Intimate partner violence     Fear of current or ex partner: Not on file     Emotionally abused: Not on file     Physically abused: Not on file     Forced sexual activity: Not on file   Other Topics Concern    Not on file   Social History Narrative    Not on file     Past Surgical History:   Procedure Laterality Date    ESOPHAGOSCOPY      WISDOM TOOTH EXTRACTION       Past Medical History:   Diagnosis Date    Generalized anxiety disorder 3/4/2014    Did not tolerate bupar at 15mg BID, does well at 7.5mg BID. Off meds now, however.  Ovarian cyst     Palpitations 4/15/2014    Neg w/u with cardio, see chart.  Panic attacks     Pre-syncope 4/15/2014    Neg w/u with cardio, see chart.  PVC (premature ventricular contraction)      No Known Allergies  Prior to Admission medications    Medication Sig Start Date End Date Taking?  Authorizing Provider General: No focal deficit present. Mental Status: She is alert and oriented to person, place, and time. Mental status is at baseline. GCS: GCS eye subscore is 4. GCS verbal subscore is 5. GCS motor subscore is 6. Cranial Nerves: No cranial nerve deficit. Sensory: No sensory deficit. Motor: No weakness. Coordination: Coordination normal.      Gait: Gait normal.      Deep Tendon Reflexes: Reflexes normal.   Psychiatric:         Behavior: Behavior normal.         Thought Content: Thought content normal.         Judgment: Judgment normal.         MDM:    No results found for this visit on 01/17/21. The patient's constellation of symptoms have resolved this pattern is nonconcerning for emergent testing at this point. The patient's facial tingling occurred after she had been exercising and the patient's numbness and tingling in both of her hands occurred after coming in from the cold outside into a warm environment. These episodes do not warrant CT scan or emergent testing. My typical dicussion, presentation,and considerations for this patients' chief complaint, diagnosis, differential diagnosis, medications, medication use,  medication safety and medication interactions have been explained and outlined to this patient for thispatient encounter. I have stressed need for follow up and reexamination for this encounter and or return to the emergency department if any changes or any concern. Final Impression    1. Facial tingling    2.  Numbness and tingling in both hands              Rima Cornejo DO  01/17/21 7570

## 2021-01-18 NOTE — ED NOTES
Pt discharged with instructions and pt stated understanding.   Pt walked out of the [facility] [id number], RN  01/17/21 6993

## 2021-01-19 ENCOUNTER — CARE COORDINATION (OUTPATIENT)
Dept: OTHER | Facility: CLINIC | Age: 26
End: 2021-01-19

## 2021-01-19 NOTE — CARE COORDINATION
[address] [facility] ED Follow Up Call    2021    Patient: Alex Steiner Patient : 1995   MRN: [id number]  Reason for Admission: facial tingling and numbness in hands  Discharge Date: 2021      Date/Time:  2021 10:09 AM  Attempted to reach patient by telephone. Call within 2 business days of discharge: Yes Left HIPPA compliant message requesting a return call. UTR letter sent via [employer]. No further outreach scheduled with this ACM, ACM will sign off care team at this time. Episode of Care resolved. Patient has this ACM's contact information if future needs arise.

## 2021-03-24 ENCOUNTER — OFFICE VISIT (OUTPATIENT)
Dept: FAMILY MEDICINE CLINIC | Age: 26
End: 2021-03-24
Payer: COMMERCIAL

## 2021-03-24 VITALS
SYSTOLIC BLOOD PRESSURE: 102 MMHG | HEART RATE: 72 BPM | TEMPERATURE: 98.8 F | BODY MASS INDEX: 24.75 KG/M2 | OXYGEN SATURATION: 97 % | RESPIRATION RATE: 16 BRPM | DIASTOLIC BLOOD PRESSURE: 72 MMHG | WEIGHT: 158 LBS

## 2021-03-24 DIAGNOSIS — R35.0 FREQUENCY OF URINATION: ICD-10-CM

## 2021-03-24 DIAGNOSIS — R10.9 ACUTE LEFT FLANK PAIN: Primary | ICD-10-CM

## 2021-03-24 LAB
BILIRUBIN, POC: NEGATIVE
BLOOD URINE, POC: NEGATIVE
CLARITY, POC: ABNORMAL
COLOR, POC: ABNORMAL
GLUCOSE URINE, POC: NEGATIVE
KETONES, POC: NEGATIVE
LEUKOCYTE EST, POC: ABNORMAL
NITRITE, POC: NEGATIVE
PH, POC: 6
PROTEIN, POC: NEGATIVE
SPECIFIC GRAVITY, POC: 1.02
UROBILINOGEN, POC: ABNORMAL

## 2021-03-24 PROCEDURE — 99213 OFFICE O/P EST LOW 20 MIN: CPT | Performed by: NURSE PRACTITIONER

## 2021-03-24 PROCEDURE — 81002 URINALYSIS NONAUTO W/O SCOPE: CPT | Performed by: NURSE PRACTITIONER

## 2021-03-24 RX ORDER — ESCITALOPRAM OXALATE 5 MG/1
5 TABLET ORAL DAILY
COMMUNITY
End: 2021-05-14

## 2021-03-24 ASSESSMENT — ENCOUNTER SYMPTOMS
BACK PAIN: 1
ABDOMINAL PAIN: 0
NAUSEA: 0
RESPIRATORY NEGATIVE: 1

## 2021-03-24 ASSESSMENT — PATIENT HEALTH QUESTIONNAIRE - PHQ9
SUM OF ALL RESPONSES TO PHQ QUESTIONS 1-9: 0
SUM OF ALL RESPONSES TO PHQ9 QUESTIONS 1 & 2: 0
SUM OF ALL RESPONSES TO PHQ QUESTIONS 1-9: 0

## 2021-03-24 NOTE — PROGRESS NOTES
Jessica Wesley (:  1995) is a 27 y.o. female,Established patient, here for evaluation of the following chief complaint(s):  Urinary Tract Infection (patient is having the frequency to urinate and lower back pain symptoms started last night)      ASSESSMENT/PLAN:  1. Acute left flank pain  2. Frequency of urination  -     Culture, Urine  -     POCT Urinalysis no Micro      Return if symptoms worsen or fail to improve. SUBJECTIVE/OBJECTIVE:  HPI   Increase in urinary frequency, left sided back pain. No fever. Has had some hesitancy, no urgency. No concern for STI. No new partner. Some mild menstrual cramping due to start in two to three days. Pain just started this AM.   Going to gym, had leg day yesterday. Unsure if any injury at gym. Review of Systems   Constitutional: Negative for chills and fever. Respiratory: Negative. Cardiovascular: Negative. Gastrointestinal: Negative for abdominal pain and nausea. Genitourinary: Positive for flank pain, frequency and urgency. Negative for dysuria, hematuria, vaginal bleeding and vaginal discharge. Musculoskeletal: Positive for back pain. Neurological: Negative for headaches. Physical Exam  Vitals signs reviewed. Constitutional:       Appearance: Normal appearance. HENT:      Head: Normocephalic and atraumatic. Eyes:      Extraocular Movements: Extraocular movements intact. Pupils: Pupils are equal, round, and reactive to light. Neck:      Musculoskeletal: Normal range of motion and neck supple. Cardiovascular:      Rate and Rhythm: Normal rate and regular rhythm. Heart sounds: Normal heart sounds. No murmur. Pulmonary:      Effort: Pulmonary effort is normal. No respiratory distress. Breath sounds: Normal breath sounds. Abdominal:      General: Abdomen is flat. Bowel sounds are normal. There is no distension. Palpations: Abdomen is soft. There is no mass. Tenderness:  There is no abdominal tenderness. There is right CVA tenderness. There is no left CVA tenderness. Hernia: No hernia is present. Skin:     General: Skin is warm and dry. Capillary Refill: Capillary refill takes less than 2 seconds. Neurological:      General: No focal deficit present. Mental Status: She is alert and oriented to person, place, and time. Psychiatric:         Mood and Affect: Mood normal.         Behavior: Behavior normal.         Thought Content: Thought content normal.         Judgment: Judgment normal.                 An electronic signature was used to authenticate this note.     --RITESH Wagner - CNP

## 2021-03-25 LAB — URINE CULTURE, ROUTINE: NORMAL

## 2021-03-31 ENCOUNTER — HOSPITAL ENCOUNTER (OUTPATIENT)
Age: 26
Discharge: HOME | End: 2021-03-31
Payer: COMMERCIAL

## 2021-03-31 LAB
ALBUMIN SERPL-MCNC: 4.7 GM/DL (ref 3.4–5)
ALP BLD-CCNC: 48 IU/L (ref 40–129)
ALT SERPL-CCNC: 12 U/L (ref 10–40)
ANION GAP SERPL CALCULATED.3IONS-SCNC: 9 MMOL/L (ref 4–16)
AST SERPL-CCNC: 12 IU/L (ref 15–37)
BASOPHILS ABSOLUTE: 0.1 K/CU MM
BASOPHILS RELATIVE PERCENT: 0.9 % (ref 0–1)
BILIRUB SERPL-MCNC: 0.2 MG/DL (ref 0–1)
BUN BLDV-MCNC: 11 MG/DL (ref 6–23)
CALCIUM SERPL-MCNC: 9.8 MG/DL (ref 8.3–10.6)
CHLORIDE BLD-SCNC: 95 MMOL/L (ref 99–110)
CO2: 24 MMOL/L (ref 21–32)
CREAT SERPL-MCNC: 0.8 MG/DL (ref 0.6–1.1)
DIFFERENTIAL TYPE: ABNORMAL
EOSINOPHILS ABSOLUTE: 0.2 K/CU MM
EOSINOPHILS RELATIVE PERCENT: 2.9 % (ref 0–3)
GFR AFRICAN AMERICAN: >60 ML/MIN/1.73M2
GFR NON-AFRICAN AMERICAN: >60 ML/MIN/1.73M2
GLUCOSE BLD-MCNC: 87 MG/DL (ref 70–99)
HCT VFR BLD CALC: 39.6 % (ref 37–47)
HEMOGLOBIN: 12.6 GM/DL (ref 12.5–16)
IMMATURE NEUTROPHIL %: 0.3 % (ref 0–0.43)
LYMPHOCYTES ABSOLUTE: 2.9 K/CU MM
LYMPHOCYTES RELATIVE PERCENT: 37.8 % (ref 24–44)
MAGNESIUM: 2.1 MG/DL (ref 1.8–2.4)
MCH RBC QN AUTO: 29.5 PG (ref 27–31)
MCHC RBC AUTO-ENTMCNC: 31.8 % (ref 32–36)
MCV RBC AUTO: 92.7 FL (ref 78–100)
MONOCYTES ABSOLUTE: 0.4 K/CU MM
MONOCYTES RELATIVE PERCENT: 5 % (ref 0–4)
PDW BLD-RTO: 13.2 % (ref 11.7–14.9)
PLATELET # BLD: 310 K/CU MM (ref 140–440)
PMV BLD AUTO: 9.1 FL (ref 7.5–11.1)
POTASSIUM SERPL-SCNC: 3.6 MMOL/L (ref 3.5–5.1)
RBC # BLD: 4.27 M/CU MM (ref 4.2–5.4)
SEGMENTED NEUTROPHILS ABSOLUTE COUNT: 4.1 K/CU MM
SEGMENTED NEUTROPHILS RELATIVE PERCENT: 53.1 % (ref 36–66)
SODIUM BLD-SCNC: 128 MMOL/L (ref 135–145)
TOTAL IMMATURE NEUTOROPHIL: 0.02 K/CU MM
TOTAL PROTEIN: 7.4 GM/DL (ref 6.4–8.2)
TSH HIGH SENSITIVITY: 1.54 UIU/ML (ref 0.27–4.2)
WBC # BLD: 7.6 K/CU MM (ref 4–10.5)

## 2021-03-31 PROCEDURE — 83735 ASSAY OF MAGNESIUM: CPT

## 2021-03-31 PROCEDURE — 84443 ASSAY THYROID STIM HORMONE: CPT

## 2021-03-31 PROCEDURE — 85025 COMPLETE CBC W/AUTO DIFF WBC: CPT

## 2021-03-31 PROCEDURE — 82306 VITAMIN D 25 HYDROXY: CPT

## 2021-03-31 PROCEDURE — 36415 COLL VENOUS BLD VENIPUNCTURE: CPT

## 2021-03-31 PROCEDURE — 80053 COMPREHEN METABOLIC PANEL: CPT

## 2021-04-01 LAB — VITAMIN D 25-HYDROXY: 46.39 NG/ML

## 2021-04-13 ENCOUNTER — HOSPITAL ENCOUNTER (OUTPATIENT)
Age: 26
Discharge: HOME | End: 2021-04-13
Payer: COMMERCIAL

## 2021-04-13 LAB
ANION GAP SERPL CALCULATED.3IONS-SCNC: 8 MMOL/L (ref 4–16)
BUN BLDV-MCNC: 18 MG/DL (ref 6–23)
CALCIUM SERPL-MCNC: 9.9 MG/DL (ref 8.3–10.6)
CHLORIDE BLD-SCNC: 98 MMOL/L (ref 99–110)
CO2: 29 MMOL/L (ref 21–32)
CREAT SERPL-MCNC: 0.7 MG/DL (ref 0.6–1.1)
GFR AFRICAN AMERICAN: >60 ML/MIN/1.73M2
GFR NON-AFRICAN AMERICAN: >60 ML/MIN/1.73M2
GLUCOSE BLD-MCNC: 91 MG/DL (ref 70–99)
POTASSIUM SERPL-SCNC: 3.9 MMOL/L (ref 3.5–5.1)
SODIUM BLD-SCNC: 135 MMOL/L (ref 135–145)

## 2021-04-13 PROCEDURE — 80048 BASIC METABOLIC PNL TOTAL CA: CPT

## 2021-04-13 PROCEDURE — 36415 COLL VENOUS BLD VENIPUNCTURE: CPT

## 2021-04-16 ENCOUNTER — HOSPITAL ENCOUNTER (EMERGENCY)
Age: 26
Discharge: HOME | End: 2021-04-16
Attending: EMERGENCY MEDICINE
Payer: COMMERCIAL

## 2021-04-16 VITALS
BODY MASS INDEX: 24.64 KG/M2 | HEIGHT: 67 IN | SYSTOLIC BLOOD PRESSURE: 104 MMHG | DIASTOLIC BLOOD PRESSURE: 78 MMHG | OXYGEN SATURATION: 100 % | WEIGHT: 157 LBS | RESPIRATION RATE: 18 BRPM | HEART RATE: 72 BPM | TEMPERATURE: 98.3 F

## 2021-04-16 DIAGNOSIS — F41.1 ANXIETY STATE: ICD-10-CM

## 2021-04-16 DIAGNOSIS — R42 LIGHTHEADED: Primary | ICD-10-CM

## 2021-04-16 LAB
EKG ATRIAL RATE: 81 BPM
EKG DIAGNOSIS: NORMAL
EKG P AXIS: 74 DEGREES
EKG P-R INTERVAL: 142 MS
EKG Q-T INTERVAL: 406 MS
EKG QRS DURATION: 62 MS
EKG QTC CALCULATION (BAZETT): 471 MS
EKG R AXIS: 62 DEGREES
EKG T AXIS: 51 DEGREES
EKG VENTRICULAR RATE: 81 BPM

## 2021-04-16 PROCEDURE — 93010 ELECTROCARDIOGRAM REPORT: CPT | Performed by: INTERNAL MEDICINE

## 2021-04-16 PROCEDURE — 93005 ELECTROCARDIOGRAM TRACING: CPT | Performed by: EMERGENCY MEDICINE

## 2021-04-16 PROCEDURE — 99283 EMERGENCY DEPT VISIT LOW MDM: CPT

## 2021-04-16 NOTE — LETTER
[facility] Emergency Department  [address] [place] [address]  Phone: [phone]  Fax: [phone]             April 16, 2021    Patient: Keyana Albarado   YOB: 1995   Date of Visit: 4/16/2021       To Whom It May Concern:    Barbara Lujan was seen and treated in our emergency department on 4/16/2021. She may return to work on 4/19/2021.       Sincerely,             Signature:__________________________________

## 2021-04-16 NOTE — ED PROVIDER NOTES
eMERGENCY dEPARTMENT eNCOUnter      PCP: Clarita Booth    Chief Complaint   Patient presents with    Other     pt. states she was  at work and was feeling light headed, also feeling as if she was going to pass out. Pt  states she is feeling anxious. Has history of nxiety attacks       HPI    Cinthya Luke is a 27 y.o. female who presents with lightheadedness, anxiety. She states she was at work, was sitting down, but stood up, was trying to reach for something when she started feeling lightheaded, full in her head. She states she sat back down. Reports that she then started feeling anxious. She reports that she drank some water, she then went home. States that she was resting and trying to drink more fluids as she has been told to do before but she started to get anxious and panicked. Reports that she has had the same symptoms multiple times in the past.  She states that she did start an anxiety medication about 2 months ago. Reports that she is seen cardiologist and primary care provider for her lightheaded episodes which she has had frequently. States this 1 was the same as previous. Reports that she has been in normal state of health. No recent illnesses with diarrhea, vomiting. No loss of fluid. She denies vaginal bleeding, blood in the stool. No bleeding. Denies risk for pregnancy. No abdominal pain. REVIEW OF SYSTEMS    Constitutional:  Denies fever, chills.    HENT:  Denies sore throat or ear pain   Cardiovascular:  Denies chest pain, palpitations   Respiratory:  Denies cough or shortness of breath    GI:  Denies abdominal pain, nausea, vomiting, or diarrhea  :  Denies any urinary symptoms, flank pain  Musculoskeletal:  Denies back pain, extremity pain  Skin:  Denies rash, color change  Neurologic:  Denies headache, focal weakness or sensory changes   Lymphatic:  Denies swollen glands, edema    All other review of systems are negative  See HPI and nursing notes for additional information     PAST MEDICAL AND SURGICAL HISTORY    Past Medical History:   Diagnosis Date    Generalized anxiety disorder 3/4/2014    Did not tolerate bupar at 15mg BID, does well at 7.5mg BID. Off meds now, however.  Ovarian cyst     Palpitations 4/15/2014    Neg w/u with cardio, see chart.  Panic attacks     Pre-syncope 4/15/2014    Neg w/u with cardio, see chart.       PVC (premature ventricular contraction)      Past Surgical History:   Procedure Laterality Date    ESOPHAGOSCOPY      WISDOM TOOTH EXTRACTION         CURRENT MEDICATIONS    Current Outpatient Rx   Medication Sig Dispense Refill    escitalopram (LEXAPRO) 5 MG tablet Take 5 mg by mouth daily      methylPREDNISolone (MEDROL DOSEPACK) 4 MG tablet Take 4 mg by mouth See Admin Instructions      amoxicillin (AMOXIL) 875 MG tablet Take 875 mg by mouth 2 times daily      fluticasone (FLONASE) 50 MCG/ACT nasal spray 1 spray by Each Nostril route daily (Patient not taking: Reported on 3/24/2021) 1 Bottle 0    hydrOXYzine (ATARAX) 25 MG tablet Take 25 mg by mouth 3 times daily as needed for Itching         ALLERGIES    No Known Allergies    SOCIAL AND FAMILY HISTORY    Social History     Socioeconomic History    Marital status: Single     Spouse name: None    Number of children: None    Years of education: None    Highest education level: None   Occupational History    None   Social Needs    Financial resource strain: None    Food insecurity     Worry: None     Inability: None    Transportation needs     Medical: None     Non-medical: None   Tobacco Use    Smoking status: Never Smoker    Smokeless tobacco: Never Used   Substance and Sexual Activity    Alcohol use: Not Currently     Comment: occasionally    Drug use: No    Sexual activity: Not Currently     Partners: Male   Lifestyle    Physical activity     Days per week: None     Minutes per session: None    Stress: None   Relationships    Social connections     Talks on phone: None     Gets together: None     Attends Zoroastrian service: None     Active member of club or organization: None     Attends meetings of clubs or organizations: None     Relationship status: None    Intimate partner violence     Fear of current or ex partner: None     Emotionally abused: None     Physically abused: None     Forced sexual activity: None   Other Topics Concern    None   Social History Narrative    None     Family History   Problem Relation Age of Onset    Colon Cancer Maternal Grandmother     Cancer Maternal Grandfather     Heart Disease Paternal Grandfather     Heart Attack Paternal Grandfather     Breast Cancer Neg Hx          PHYSICAL EXAM    VITAL SIGNS: /84   Pulse 88   Temp 98.3 °F (36.8 °C) (Oral)   Resp 20   Ht 5' 7\" (1.702 m)   Wt 157 lb (71.2 kg)   LMP 04/02/2021   SpO2 100%   BMI 24.59 kg/m²    Constitutional:  Well developed, No acute distress. HENT:  Normocephalic, Atraumatic, PERRL. EOMI. Sclera clear. Conjunctiva normal.  Neck: supple without ridigity  Cardiovascular:  Regular rate and rhythm, No murmurs  Respiratory:  Nonlabored breathing. Normal breath sounds  Abdomen: Soft, Non tender, bowel sounds present. Musculoskeletal: No edema, No tenderness  Integument:  Warm, Dry, no rash  Neurologic:  Alert & oriented , No focal deficits noted. Speech normal.  Psychiatric:  Affect is anxious. EKG  This EKG was interpreted by me. Rate is 81, rhythm is sinus. W[place] and QT intervals are within normal limits. There is no ST segment or T wave changes. V1 lead is broken, unable to interpret. This EKG was compared to previous EKG from date 12/4/2020          ED COURSE & MEDICAL DECISION MAKING       Vital signs and nursing notes reviewed during ED course. All pertinent Lab data and radiographic results reviewed with patient at bedside.        The patient and/or the family were informed of the results of any tests/labs/imaging, the treatment plan, and time was allotted to answer questions. 75-year-old female presenting with lightheaded episode which then caused her to become anxious. She is had this occur multiple times in the past.  She is well-appearing nontoxic. She is not in any distress, though does appear slightly anxious with her pressured speech and discussion with me. EKG shows no sign of ischemia, V1 lead is broken, unable to completely interpret the V1, but the rest of the leads appear without any change from previous EKG, no sign of malignant arrhythmia. Given that she has had this occur multiple times in the past, has reassuring vital signs I do not feel that further work-up at this point is warranted. We will plan for discharge home and outpatient follow-up with her primary care provider if she has any new or worsening signs or symptoms she is instructed return the emergency department for reevaluation.       Clinical  IMPRESSION    Lightheaded, anxiety          (Please note the MDM and HPI sections of this note were completed with a voice recognition program.  Efforts were made to edit the dictations but occasionally words are mis-transcribed.)      Swati Elena DO  04/16/21 [id number]

## 2021-04-17 ENCOUNTER — CARE COORDINATION (OUTPATIENT)
Dept: OTHER | Facility: CLINIC | Age: 26
End: 2021-04-17

## 2021-04-19 ENCOUNTER — CARE COORDINATION (OUTPATIENT)
Dept: OTHER | Facility: CLINIC | Age: 26
End: 2021-04-19

## 2021-04-19 NOTE — TELEPHONE ENCOUNTER
Thank you for the detailed report Jean-Claude Roque, greatly appreciate it! I will attempt her again today.

## 2021-04-19 NOTE — CARE COORDINATION
Ambulatory Care Coordination Note  4/19/2021  CM Risk Score: 5  Charlson 10 Year Mortality Risk Score: 2%     ACC: Joy Quispe, DEWEY    Summary Note: ACM attempted 2nd outreach to reach patient for introduction to Associate Care Management. HIPAA compliant message left requesting a return phone call at patients convenience. Unable to Reach Letter sent to patient via [employer]. Will continue to outreach patient. No future appointments.

## 2021-04-29 ENCOUNTER — CARE COORDINATION (OUTPATIENT)
Dept: OTHER | Facility: CLINIC | Age: 26
End: 2021-04-29

## 2021-05-14 ENCOUNTER — APPOINTMENT (OUTPATIENT)
Dept: CT IMAGING | Age: 26
End: 2021-05-14
Payer: COMMERCIAL

## 2021-05-14 ENCOUNTER — HOSPITAL ENCOUNTER (EMERGENCY)
Age: 26
Discharge: HOME | End: 2021-05-14
Attending: EMERGENCY MEDICINE
Payer: COMMERCIAL

## 2021-05-14 VITALS
RESPIRATION RATE: 18 BRPM | HEIGHT: 67 IN | SYSTOLIC BLOOD PRESSURE: 131 MMHG | TEMPERATURE: 98.4 F | DIASTOLIC BLOOD PRESSURE: 87 MMHG | WEIGHT: 160 LBS | HEART RATE: 86 BPM | BODY MASS INDEX: 25.11 KG/M2 | OXYGEN SATURATION: 100 %

## 2021-05-14 DIAGNOSIS — R42 LIGHTHEADEDNESS: Primary | ICD-10-CM

## 2021-05-14 LAB
ALBUMIN SERPL-MCNC: 4.3 GM/DL (ref 3.4–5)
ALP BLD-CCNC: 43 IU/L (ref 40–129)
ALT SERPL-CCNC: 13 U/L (ref 10–40)
ANION GAP SERPL CALCULATED.3IONS-SCNC: 10 MMOL/L (ref 4–16)
AST SERPL-CCNC: 15 IU/L (ref 15–37)
BASOPHILS ABSOLUTE: 0 K/CU MM
BASOPHILS RELATIVE PERCENT: 0.6 % (ref 0–1)
BILIRUB SERPL-MCNC: 0.3 MG/DL (ref 0–1)
BUN BLDV-MCNC: 12 MG/DL (ref 6–23)
CALCIUM SERPL-MCNC: 9.5 MG/DL (ref 8.3–10.6)
CHLORIDE BLD-SCNC: 100 MMOL/L (ref 99–110)
CO2: 26 MMOL/L (ref 21–32)
CREAT SERPL-MCNC: 0.8 MG/DL (ref 0.6–1.1)
DIFFERENTIAL TYPE: ABNORMAL
EOSINOPHILS ABSOLUTE: 0.2 K/CU MM
EOSINOPHILS RELATIVE PERCENT: 2.6 % (ref 0–3)
GFR AFRICAN AMERICAN: >60 ML/MIN/1.73M2
GFR NON-AFRICAN AMERICAN: >60 ML/MIN/1.73M2
GLUCOSE BLD-MCNC: 123 MG/DL (ref 70–99)
HCG QUALITATIVE: NEGATIVE
HCT VFR BLD CALC: 36.9 % (ref 37–47)
HEMOGLOBIN: 12.2 GM/DL (ref 12.5–16)
IMMATURE NEUTROPHIL %: 0.2 % (ref 0–0.43)
LYMPHOCYTES ABSOLUTE: 2.3 K/CU MM
LYMPHOCYTES RELATIVE PERCENT: 37.1 % (ref 24–44)
MCH RBC QN AUTO: 29.5 PG (ref 27–31)
MCHC RBC AUTO-ENTMCNC: 33.1 % (ref 32–36)
MCV RBC AUTO: 89.3 FL (ref 78–100)
MONOCYTES ABSOLUTE: 0.4 K/CU MM
MONOCYTES RELATIVE PERCENT: 5.7 % (ref 0–4)
PDW BLD-RTO: 12.7 % (ref 11.7–14.9)
PLATELET # BLD: 270 K/CU MM (ref 140–440)
PMV BLD AUTO: 9.2 FL (ref 7.5–11.1)
POTASSIUM SERPL-SCNC: 3.8 MMOL/L (ref 3.5–5.1)
RBC # BLD: 4.13 M/CU MM (ref 4.2–5.4)
SEGMENTED NEUTROPHILS ABSOLUTE COUNT: 3.3 K/CU MM
SEGMENTED NEUTROPHILS RELATIVE PERCENT: 53.8 % (ref 36–66)
SODIUM BLD-SCNC: 136 MMOL/L (ref 135–145)
TOTAL IMMATURE NEUTOROPHIL: 0.01 K/CU MM
TOTAL PROTEIN: 6.6 GM/DL (ref 6.4–8.2)
WBC # BLD: 6.2 K/CU MM (ref 4–10.5)

## 2021-05-14 PROCEDURE — 80053 COMPREHEN METABOLIC PANEL: CPT

## 2021-05-14 PROCEDURE — 70450 CT HEAD/BRAIN W/O DYE: CPT

## 2021-05-14 PROCEDURE — 85025 COMPLETE CBC W/AUTO DIFF WBC: CPT

## 2021-05-14 PROCEDURE — 99284 EMERGENCY DEPT VISIT MOD MDM: CPT

## 2021-05-14 PROCEDURE — 84703 CHORIONIC GONADOTROPIN ASSAY: CPT

## 2021-05-14 RX ORDER — 0.9 % SODIUM CHLORIDE 0.9 %
1000 INTRAVENOUS SOLUTION INTRAVENOUS ONCE
Status: DISCONTINUED | OUTPATIENT
Start: 2021-05-14 | End: 2021-05-14

## 2021-05-14 NOTE — ED PROVIDER NOTES
As physician-in-triage, I performed a medical screening history and physical exam on this patient. I performed a medical screening examination and evaluated this patient briefly with the purpose of initiating their ED workup in an expeditious manner. Please see notes from other ED providers regarding comprehensive evaluation including full history, physical exam, interpretation of results, and medical decision making/disposition. CHIEF COMPLAINT  Chief Complaint   Patient presents with    Dizziness     C/o lightheadedness for the past two days. Patient states her head feels heavy, majority of the time when going from a sitting to standing position. Denies any chest pain, shortness of breath or any other symptoms. HISTORY OF PRESENT ILLNESS  Nita Dhaliwal is a 27 y.o. female that presents to the emergency department for evaluation. Patient notes over the past 2 days, she has had a lightheaded sensation. Patient notes fullness in her head. Symptoms are more apparent when she is moving from a seated to a standing position. Denies chest pain or shortness of breath. Denies falls, head trauma, neck trauma. No syncope, dizziness, lightheadedness. No double vision, blurry vision, change in vision. No flashes or floaters. No tinnitus or buzzing. No facial droop, slurred speech, aphasia, dysphagia. No abdominal pain, nausea, vomiting, diarrhea, constipation, hematochezia, melena. No fevers, chills, diaphoresis, night sweats. Denies seasonal allergies. Denies additional precipitating, modifying, alleviating features. [facility] PHYSICAL EXAM  /87   Pulse 86   Temp 98.4 °F (36.9 °C) (Oral)   Resp 18   Ht 5' 7\" (1.702 m)   Wt 160 lb (72.6 kg)   SpO2 100%   BMI 25.06 kg/m²     On exam, the patient appears in no acute distress. Speech is clear. Breathing is unlabored. Moves all extremities. Neurologic exam is unremarkable. No focal or lateralizing neurologic deficits.   Cranial nerves II through XII are grossly intact. No dysmetria. No dysdiadochokinesis. Finger-nose and heel-to-shin testing is equal symmetric bilaterally. Comment: Please note this report has been produced using speech recognition software and may contain errors related to that system including errors in grammar, punctuation, and spelling, as well as words and phrases that may be inappropriate. If there are any questions or concerns please feel free to contact the dictating provider for clarification.        [address] [facility],   05/14/21 2440

## 2021-05-14 NOTE — ED TRIAGE NOTES
Arrived ambulatory  to room 4 for triage. Tolerated without difficulty. Bed in lowest position. Call light given.

## 2021-05-14 NOTE — ED PROVIDER NOTES
EMERGENCY DEPARTMENT ENCOUNTER    Patient: Koko Horton  MRN: [id number]  : 1995  Date of Evaluation: 2021  ED Provider:  [address] [place]  Chief Complaint   Patient presents with    Dizziness     C/o lightheadedness for the past two days. Patient states her head feels heavy, majority of the time when going from a sitting to standing position. Denies any chest pain, shortness of breath or any other symptoms. HPI  Koko Horton is a 27 y.o. female who presents with complaints of moderate to severe, persistent lightheadedness for the last 2 days with associated fullness in the head. Worsened with changes in position, particularly from going from a sitting to standing position. Denies headache or sensation of motion or spinning, vision changes, ringing in the ears, loss of sensation, focal weakness, chest pain, shortness of breath or palpitations or any other associated symptoms or complaints or concerns. REVIEW OF SYSTEMS    Constitutional: negative for fever, chills  Neurological: negative for HA, focal weakness, loss of sensation  Ophthalmic: negative for vision change  ENT: negative for congestion, rhinorrhea  Cardiovascular: negative for chest pain  Respiratory: negative for SOB, cough  GI: negative for abdominal pain, nausea, vomiting, diarrhea, constipation  : negative for dysuria, hematuria  Musculoskeletal: negative for myalgias, decreased ROM, joint swelling  Dermatological: negative for rash, wounds  Heme: Negative for bleeding, bruising      PAST MEDICAL HISTORY  Past Medical History:   Diagnosis Date    Generalized anxiety disorder 3/4/2014    Did not tolerate bupar at 15mg BID, does well at 7.5mg BID. Off meds now, however.  Ovarian cyst     Palpitations 4/15/2014    Neg w/u with cardio, see chart.  Panic attacks     Pre-syncope 4/15/2014    Neg w/u with cardio, see chart.       PVC (premature ventricular contraction)        CURRENT MEDICATIONS  [unfilled]    ALLERGIES  No Known Allergies    SURGICAL HISTORY  Past Surgical History:   Procedure Laterality Date    ESOPHAGOSCOPY      WISDOM TOOTH EXTRACTION         FAMILY HISTORY  Family History   Problem Relation Age of Onset    Colon Cancer Maternal Grandmother     Cancer Maternal Grandfather     Heart Disease Paternal Grandfather     Heart Attack Paternal Grandfather     Breast Cancer Neg Hx        SOCIAL HISTORY  Social History     Socioeconomic History    Marital status: Single     Spouse name: None    Number of children: None    Years of education: None    Highest education level: None   Occupational History    None   Social Needs    Financial resource strain: None    Food insecurity     Worry: None     Inability: None    Transportation needs     Medical: None     Non-medical: None   Tobacco Use    Smoking status: Never Smoker    Smokeless tobacco: Never Used   Substance and Sexual Activity    Alcohol use: Not Currently     Comment: occasionally    Drug use: No    Sexual activity: Not Currently     Partners: Male   Lifestyle    Physical activity     Days per week: None     Minutes per session: None    Stress: None   Relationships    Social connections     Talks on phone: None     Gets together: None     Attends Lutheran service: None     Active member of club or organization: None     Attends meetings of clubs or organizations: None     Relationship status: None    Intimate partner violence     Fear of current or ex partner: None     Emotionally abused: None     Physically abused: None     Forced sexual activity: None   Other Topics Concern    None   Social History Narrative    None         **Past medical, family and social histories, and nursing notes reviewed and verified by me**      PHYSICAL EXAM  VITAL SIGNS:   ED Triage Vitals [05/14/21 1832]   Enc Vitals Group      /87      Pulse 86      Resp 18      Temp 98.4 °F (36.9 °C)      Temp Source Oral bilaterally  Tone: normal  Abnormal Movements: Absent    DTRs- intact bilaterally and symmetrical.  Toes-downgoing bilaterally  Sensory:  Light Touch and Pinprick  Right Upper Extremity: normal  Left Upper Extremity: normal  Right Lower Extremity: normal  Left Lower Extremity: normal    Normal finger-to-nose, normal heel-to-shin, normal rapid alternating hand movements, no pronator drift      NIH Stroke Score:  1A: Level of Consciousness  Alert; keenly responsive 0  Arouses to minor stimulation +1  Requires repeated stimulation to arouse +2  Movements to Pain +2  Postures or Unresponsive +3     1B: Ask Month and Age  Both Questions Right 0  1 Question Right +1  0 Questions Right +2  Dysarthric/Intubated/ Trauma/Language Barrier +1  Aphasic +2     1C: 'Blink Eyes' & 'Squeeze Hands'(Pantomime Commands if Communication Barrier)  Performs Both Tasks0  Performs 1 Task+1  Performs 0 Tasks+2     2: Test Horizontal Extraocular Movements  Normal 0  Partial Gaze Palsy: Can Be Overcome +1  Partial Gaze Palsy: Corrects with Oculocephalic Reflex +1  Forced Gaze Palsy: Cannot Be Overcome +2     3: Test Visual Fields  No Visual Loss 0  Partial Hemianopia +1  Complete Hemianopia +2  Patient is Bilaterally Blind +3  Bilateral Hemianopia +3     4: Test Facial Palsy(Use Grimace if Obtunded)  Normal symmetry 0  Minor paralysis (flat nasolabial fold, smile asymmetry) +1  Partial paralysis (lower face) +2  Unilateral Complete paralysis (upper/lower face) +3  Bilateral Complete paralysis (upper/lower face) +3     5A: Test Left Arm Motor Drift  Amputation/Joint Fusion 0  No Drift for 10 Seconds 0  Drift, but doesn't hit bed +1  Drift, hits bed +2  Some Effort Against Gravity +2  No Effort Against Gravity +3  No Movement +4     5B:  Test Right Arm Motor Drift  Amputation/Joint Fusion 0  No Drift for 10 Seconds 0  Drift, but doesn't hit bed +1  Drift, hits bed +2  Some Effort Against Gravity +2  No Effort Against Gravity +3  No Movement +4 6A: Test Left Leg Motor Drift  Amputation/Joint Fusion 0  No Drift for 5 Seconds 0  Drift, but doesn't hit bed +1  Drift, hits bed +2  Some Effort Against Gravity +2  No Effort Against Gravity +3  No Movement +4     6B: Test Right Leg Motor Drift  Amputation/Joint Fusion 0  No Drift for 5 Seconds 0  Drift, but doesn't hit bed +1  Drift, hits bed +2  Some Effort Against Gravity +2  No Effort Against Gravity +3  No Movement +4     7: Test Limb Ataxia(FTN/Heel-Shin)  Amputation/Joint Fusion 0  Does Not Understand 0  Paralyzed 0  No Ataxia 0  Ataxia in 1 Limb +1  Ataxia in 2 Limbs +2     8: Test Sensation  Normal; No sensory loss 0  Mild-Moderate Loss: Less Sharp/More Dull +1  Mild-Moderate Loss: Can Sense Being Touched +1  Complete Loss: Cannot Sense Being Touched At All +2  No Response and Quadriplegic +2  Coma/Unresponsive +2     9: Test Language/Aphasia (Describe the scene; name the words; read the sentences)  Normal; No aphasia 0  Mild-Moderate Aphasia: Some Obvious Changes, Without Significant Limitation +1  Severe Aphasia: Fragmentary Expression, Inference Needed, Cannot Identify  Materials +2  Mute/Global Aphasia: No Usable Speech/Auditory Comprehension +3  Coma/Unresponsive +3     10: Test Dysarthria (Read the words)  Intubated/Unable to Test 0  Normal 0  Mild-Moderate Dysarthria: Slurring but can be understood +1  Severe Dysarthria: Unintelligble Slurring or Out of Proportion to Dysphasia +2  Mute/Anarthric +2     11: Test Extinction/Inattention  No abnormality 0  Visual/tactile/auditory/spatial/personal inattention +1  Extinction to bilateral simultaneous stimulation +1  Profound briana-inattention (ex: does not recognize own hand) +2  Extinction to >1 modality +2     NIH score is 0.         EKG Interpretation    Interpreted by emergency department physician    Rhythm: normal sinus   Rate: normal  Axis: normal  Ectopy: none  Conduction: normal  ST Segments: normal  T Waves: normal  Q Waves: none    Clinical Impression: Normal sinus rhythm with sinus arrhythmia possible left atrial enlargement        RADIOLOGY/PROCEDURES/LABS/MEDICATIONS ADMINISTERED:    I have reviewed and interpreted all of the currently available lab results from this visit (if applicable):  Results for orders placed or performed during the hospital encounter of 05/14/21   CBC Auto Differential   Result Value Ref Range    WBC 6.2 4.0 - 10.5 K/CU MM    RBC 4.13 (L) 4.2 - 5.4 M/CU MM    Hemoglobin 12.2 (L) 12.5 - 16.0 GM/DL    Hematocrit 36.9 (L) 37 - 47 %    MCV 89.3 78 - 100 FL    MCH 29.5 27 - 31 PG    MCHC 33.1 32.0 - 36.0 %    RDW 12.7 11.7 - 14.9 %    Platelets 243 255 - 453 K/CU MM    MPV 9.2 7.5 - 11.1 FL    Differential Type AUTOMATED DIFFERENTIAL     Segs Relative 53.8 36 - 66 %    Lymphocytes % 37.1 24 - 44 %    Monocytes % 5.7 (H) 0 - 4 %    Eosinophils % 2.6 0 - 3 %    Basophils % 0.6 0 - 1 %    Segs Absolute 3.3 K/CU MM    Lymphocytes Absolute 2.3 K/CU MM    Monocytes Absolute 0.4 K/CU MM    Eosinophils Absolute 0.2 K/CU MM    Basophils Absolute 0.0 K/CU MM    Immature Neutrophil % 0.2 0 - 0.43 %    Total Immature Neutrophil 0.01 K/CU MM   Comprehensive Metabolic Panel w/ Reflex to MG   Result Value Ref Range    Sodium 136 135 - 145 MMOL/L    Potassium 3.8 3.5 - 5.1 MMOL/L    Chloride 100 99 - 110 mMol/L    CO2 26 21 - 32 MMOL/L    BUN 12 6 - 23 MG/DL    CREATININE 0.8 0.6 - 1.1 MG/DL    Glucose 123 (H) 70 - 99 MG/DL    Calcium 9.5 8.3 - 10.6 MG/DL    Albumin 4.3 3.4 - 5.0 GM/DL    Total Protein 6.6 6.4 - 8.2 GM/DL    Total Bilirubin 0.3 0.0 - 1.0 MG/DL    ALT 13 10 - 40 U/L    AST 15 15 - 37 IU/L    Alkaline Phosphatase 43 40 - 129 IU/L    GFR Non-African American >60 >60 mL/min/1.73m2    GFR African American >60 >60 mL/min/1.73m2    Anion Gap 10 4 - 16   HCG Qualitative, Serum   Result Value Ref Range    hCG Qual NEGATIVE           ABNORMAL LABS:  Labs Reviewed   CBC WITH AUTO DIFFERENTIAL - Abnormal; Notable for the following components:       Result Value    RBC 4.13 (*)     Hemoglobin 12.2 (*)     Hematocrit 36.9 (*)     Monocytes % 5.7 (*)     All other components within normal limits   COMPREHENSIVE METABOLIC PANEL W/ REFLEX TO MG FOR LOW K - Abnormal; Notable for the following components:    Glucose 123 (*)     All other components within normal limits   HCG, SERUM, QUALITATIVE         IMAGING STUDIES ORDERED:  CT HEAD WO CONTRAST    I have personally viewed the imaging studies. The radiologist interpretation is:   CT HEAD WO CONTRAST   Final Result   No acute intracranial abnormality. MEDICATIONS ADMINISTERED:  Medications - No data to display      [address] [facility]  Last vitals: /87   Pulse 86   Temp 98.4 °F (36.9 °C) (Oral)   Resp 18   Ht 5' 7\" (1.702 m)   Wt 160 lb (72.6 kg)   SpO2 100%   BMI 25.06 kg/m²     [de-identified]year-old female with lightheadedness. Most likely orthostatic in nature. Did offer the patient some IV hydration however she has declined this. I completed a structured, evidence-based clinical evaluation to screen for acute stroke and neurologic deficits in this patient. The patient has a normal detailed neurologic exam, which is highly sensitive for dangerous causes of dizziness, vertigo, or loss of balance. The evidence indicates that the patient is very low risk for an acute neurologic emergency and this is consistent with my clinical intuition. The risk of further workup or hospitalization is likely higher than the risk of the patient having a stroke or other dangerous neurologic condition. It is, therefore, in the patients best interest not to do additional emergent testing or to be hospitalized at this time.     Differential diagnoses considered included, but were not limited to, ischemic CVA, intracranial hemorrhage, meningitis/encephalitis, acute coronary syndrome, malignant cardiac dysrhythmia, pulmonary embolism, pneumonia, sepsis, or acute severe metabolic or electrolyte abnormality. Additional workup and treatment in the ED as documented above. Patient reassured and will be discharged home. I have explained to the patient in appropriate terminology our work-up in the ED and their diagnosis. I have also given anticipatory guidance and expectant management of their condition as an outpatient as per my custom. The patient was given clear discharge and follow-up instructions including return to the ER immediately for worsening concerns. The patient has been advised to follow-up with their primary care physician and/or referred physician in the next two to three days or sooner if worsening and to return to the ER immediately as above with any concerns. I provided the patient counseling with regard to my customary list of strict return precautions as well as return precautions specific to the cause for today's emergency department visit. The patient will return under these provided conditions, but should also return for new concerns or further worsening. Pt and/or family understand and agree with plan. Clinical Impression:  1. Lightheadedness        Disposition referral (if applicable):  Maryann Mcduffie  [address] [place] [address]  [phone]    Schedule an appointment as soon as possible for a visit       [facility]  [address]  [phone]    If symptoms worsen      Disposition medications (if applicable):  New Prescriptions    No medications on file       ED Provider Disposition Time  DISPOSITION            Electronically signed by: Jeffry Bruno M.D., 5/14/2021 8:01 PM      This dictation was created with voice recognition software. While attempts have been made to review the dictation as it is transcribed, on occasion the spoken word can be misinterpreted by the technology leading to omissions or inappropriate words, phrases or sentences.         Mahin Santos MD  05/14/21 2002

## 2021-05-15 NOTE — ED NOTES
Discharge instructions reviewed with patient. No additional questions asked. Voiced understanding. Encouraged patient to follow up as discussed by the ED physician.      Lexie Smallwood RN  05/14/21 2006

## 2021-05-15 NOTE — CARE COORDINATION
Ambulatory Care Coordination Note    CM Risk Score: 5  Charlson 10 Year Mortality Risk Score: 2%     ACC: Yesy Rey RN    Summary Note: ACM attempted third and final call to patient for introduction to Associate Care Management. HIPAA compliant message left requesting a return phone call at patients convenience. Final Unable to Reach Letter sent via Mail. No further outreach scheduled with this ACM, ACM will sign off care team at this time. Patient has been provided with this ACM's contact information. No future appointments.

## 2021-05-17 ENCOUNTER — CARE COORDINATION (OUTPATIENT)
Dept: OTHER | Facility: CLINIC | Age: 26
End: 2021-05-17

## 2021-05-17 ENCOUNTER — HOSPITAL ENCOUNTER (EMERGENCY)
Age: 26
Discharge: HOME | End: 2021-05-17
Payer: COMMERCIAL

## 2021-05-17 VITALS
BODY MASS INDEX: 25.11 KG/M2 | SYSTOLIC BLOOD PRESSURE: 115 MMHG | RESPIRATION RATE: 16 BRPM | TEMPERATURE: 98.4 F | HEIGHT: 67 IN | HEART RATE: 98 BPM | WEIGHT: 160 LBS | DIASTOLIC BLOOD PRESSURE: 79 MMHG | OXYGEN SATURATION: 97 %

## 2021-05-17 DIAGNOSIS — R55 VASOVAGAL NEAR SYNCOPE: ICD-10-CM

## 2021-05-17 DIAGNOSIS — R42 LIGHTHEADEDNESS: Primary | ICD-10-CM

## 2021-05-17 LAB
EKG ATRIAL RATE: 80 BPM
EKG DIAGNOSIS: NORMAL
EKG P AXIS: 77 DEGREES
EKG P-R INTERVAL: 144 MS
EKG Q-T INTERVAL: 384 MS
EKG QRS DURATION: 74 MS
EKG QTC CALCULATION (BAZETT): 442 MS
EKG R AXIS: 51 DEGREES
EKG T AXIS: 42 DEGREES
EKG VENTRICULAR RATE: 80 BPM

## 2021-05-17 PROCEDURE — 99284 EMERGENCY DEPT VISIT MOD MDM: CPT

## 2021-05-17 PROCEDURE — 93010 ELECTROCARDIOGRAM REPORT: CPT | Performed by: INTERNAL MEDICINE

## 2021-05-17 PROCEDURE — 93005 ELECTROCARDIOGRAM TRACING: CPT | Performed by: EMERGENCY MEDICINE

## 2021-05-17 NOTE — CARE COORDINATION
[facility] ED Follow Up Call    2021    Patient: Ge Davison Patient : 1995   MRN: [id number]  Reason for Admission: Lightheadedness  Discharge Date: 21      Ambulatory Care Manager (ACM) attempted to contact the patient by telephone to perform post ED visit assessment. Left HIPPA compliant message providing introduction to self and requested a call back. Will continue to outreach to patient. Will send [employer] message in the meantime.         Care Transitions ED Follow Up    Care Transitions Interventions

## 2021-05-17 NOTE — ED PROVIDER NOTES
Emergency Department Encounter    Patient: Fidel Estevez  MRN: [id number]  : 1995  Date of Evaluation: 2021  ED Provider:  Loan Woodruff MD    Triage Chief Complaint:   Dizziness    [place]:  Fidel Estevez is a 27 y.o. female that presents with concern that her blood pressure is too low. Patient reports that she had a \"head rush\" with standing. Patient reports sensation of being lightheaded intermittently with standing. Patient reports that she is currently following with cardiology for orthostatic hypotension. Patient denies any loss of consciousness. Patient reports that she has been unable to sleep tonight due to intrusive thoughts that her blood pressure is too low. Patient also reports some pounding in her bilateral temporal area that she feels is a sign of low blood pressure. Patient reports she takes hydroxyzine for anxiety. Headache described as throbbing; located bilateral temporal area. No sensation of the room spinning around her. No fever or chills. Denies any drug use or abuse. Denies any recent head injuries.     ROS - see HPI, below listed is current ROS at time of my eval:  General:  No fevers, no chills, no weakness  Eyes:  No recent vison changes, no discharge  ENT:  No sore throat, no nasal congestion, no hearing changes  Cardiovascular:  No chest pain, no palpitations  Respiratory:  No shortness of breath, no cough, no wheezing  Gastrointestinal:  No pain, no nausea, no vomiting, no diarrhea  Musculoskeletal:  No muscle pain, no joint pain  Skin:  No rash, no pruritis, no easy bruising  Neurologic:  No speech problems, no headache, no extremity numbness, no extremity tingling, no extremity weakness  Psychiatric:  No anxiety  Genitourinary:  No dysuria, no hematuria  Endocrine:  No unexpected weight gain, no unexpected weight loss  Extremities:  no edema, no pain    Past Medical History:   Diagnosis Date    Generalized anxiety disorder 3/4/2014    Did not tolerate bupar at 15mg BID, does well at 7.5mg BID. Off meds now, however.  Ovarian cyst     Palpitations 4/15/2014    Neg w/u with cardio, see chart.  Panic attacks     Pre-syncope 4/15/2014    Neg w/u with cardio, see chart.  PVC (premature ventricular contraction)      Past Surgical History:   Procedure Laterality Date    ESOPHAGOSCOPY      WISDOM TOOTH EXTRACTION       Family History   Problem Relation Age of Onset    Colon Cancer Maternal Grandmother     Cancer Maternal Grandfather     Heart Disease Paternal Grandfather     Heart Attack Paternal Grandfather     Breast Cancer Neg Hx      Social History     Socioeconomic History    Marital status: Single     Spouse name: Not on file    Number of children: Not on file    Years of education: Not on file    Highest education level: Not on file   Occupational History    Not on file   Tobacco Use    Smoking status: Never Smoker    Smokeless tobacco: Never Used   Vaping Use    Vaping Use: Never used   Substance and Sexual Activity    Alcohol use: Not Currently     Comment: occasionally    Drug use: No    Sexual activity: Not Currently     Partners: Male   Other Topics Concern    Not on file   Social History Narrative    Not on file     Social Determinants of Health     Financial Resource Strain:     Difficulty of Paying Living Expenses:    Food Insecurity:     Worried About Running Out of Food in the Last Year:     [address] in the Last Year:    Transportation Needs:     Lack of Transportation (Medical):      Lack of Transportation (Non-Medical):    Physical Activity:     Days of Exercise per Week:     Minutes of Exercise per Session:    Stress:     Feeling of Stress :    Social Connections:     Frequency of Communication with Friends and Family:     Frequency of Social Gatherings with Friends and Family:     Attends Christianity Services:     Active Member of Clubs or Organizations:     Attends Club or Organization Meetings:     Marital Status:    Intimate Partner Violence:     Fear of Current or Ex-Partner:     Emotionally Abused:     Physically Abused:     Sexually Abused:      No current facility-administered medications for this encounter. Current Outpatient Medications   Medication Sig Dispense Refill    hydrOXYzine (ATARAX) 25 MG tablet Take 25 mg by mouth 3 times daily as needed for Itching       No Known Allergies    Nursing Notes Reviewed    Physical Exam:  Triage VS:    ED Triage Vitals   Enc Vitals Group      BP       Pulse       Resp       Temp       Temp src       SpO2       Weight       Height       Head Circumference       Peak Flow       Pain Score       Pain Loc       Pain Edu? Excl. in [address]? My pulse ox interpretation is - normal    General appearance:  No acute distress. Skin:  Warm. Dry. Eye:  Extraocular movements intact. Ears, nose, mouth and throat:  Oral mucosa moist   Neck:  Trachea midline. Extremity:  No swelling. Normal ROM     Heart:  Regular rate and rhythm, normal S1 & S2, no extra heart sounds. Perfusion:  intact  Respiratory:  Lungs clear to auscultation bilaterally. Respirations nonlabored. Abdominal:  Normal bowel sounds. Soft. Nontender. Non distended. Back:  No CVA tenderness to palpation     Neurological:  Alert and oriented times 3. No focal neuro deficits. Psychiatric: Patient appears anxious. Slightly pressured speech.      I have reviewed and interpreted all of the currently available lab results from this visit (if applicable):  Results for orders placed or performed during the hospital encounter of 05/17/21   EKG 12 Lead   Result Value Ref Range    Ventricular Rate 80 BPM    Atrial Rate 80 BPM    P-R Interval 144 ms    QRS Duration 74 ms    Q-T Interval 384 ms    QTc Calculation (Bazett) 442 ms    P Axis 77 degrees    R Axis 51 degrees    T Axis 42 degrees    Diagnosis       Normal sinus rhythm  Possible Left atrial enlargement  Borderline ECG  When compared with ECG of 16-APR-2021 13:16,  No significant change was found        Radiographs (if obtained):  Radiologist's Report Reviewed:  No results found. MDM:  27year-old female with lightheadedness. Most likely vasovagal in nature. Recently evaluated in the emergency department; benign work-up.     I completed a structured, evidence-based clinical evaluation to screen for acute stroke, acute cardiogenic/arrhythmia and neurologic deficits in this patient. The patient has a normal detailed neurologic exam, which is highly sensitive for dangerous causes of dizziness, vertigo, or loss of balance. Patient does have signs of presyncope on history.     The evidence indicates that the patient is very low risk for an acute neurologic, arrhythmia or cardiogenic emergency and this is consistent with my clinical intuition. The risk of further workup or hospitalization is likely higher than the risk of the patient having a stroke or other dangerous neurologic or cardiogenic condition. It is, therefore, in the patients best interest not to do additional emergent testing or to be hospitalized at this time.     Differential diagnoses considered included, but were not limited to, ischemic CVA, intracranial hemorrhage, meningitis/encephalitis, acute coronary syndrome, malignant cardiac dysrhythmia, pulmonary embolism, pneumonia, sepsis, or acute severe metabolic or electrolyte abnormality.     Additional workup and treatment in the ED as documented above. EKG did not show any concerning findings such as Brugada, delta wave, hypertrophy or a block. Patient also has symptoms of anxiety. Patient reporting intrusive thoughts of being unable to sleep, head pounding and being concerned that her blood pressure is too low. Patient reassured and will be discharged home.   The patient has been advised to follow-up with their primary care physician and cardiologist.   Patient acknowledged understanding and agreement with plan of care. She was discharged in good condition with stable vitals.       Clinical Impression:  1. Lightheadedness    2. Vasovagal near syncope      Disposition referral (if applicable):  Cleopatra Noriega  [address] [place], [place] [id number] [phone]    Call   For close follow-up    Disposition medications (if applicable):  Discharge Medication List as of 5/17/2021  3:27 AM        ED Provider Disposition Time  DISPOSITION      You were evaluated in the emergency department today for lightheadedness/symptoms of presyncope. Your symptoms are most likely vasovagal in nature. Please see attached handout. He also have signs and symptoms of anxiousness. Please continue following up with your cardiologist and primary care physician. Return to the emerge department if you have any concerning episodes or use consciousness. Comment: Please note this report has been produced using speech recognition software and may contain errors related to that system including errors in grammar, punctuation, and spelling, as well as words and phrases that may be inappropriate. Efforts were made to edit the dictations.        Micheal Sanchez MD  05/17/21 3909

## 2021-05-20 ENCOUNTER — CARE COORDINATION (OUTPATIENT)
Dept: OTHER | Facility: CLINIC | Age: 26
End: 2021-05-20

## 2021-05-25 ENCOUNTER — CARE COORDINATION (OUTPATIENT)
Dept: OTHER | Facility: CLINIC | Age: 26
End: 2021-05-25

## 2021-05-25 NOTE — CARE COORDINATION
[facility] ED Follow Up Call    2021    Patient: Mushtaq Villegas Patient : 1995   MRN: [id number]  Reason for Admission: Lightheadedness/ dizziness  Discharge Date: 21 and 21      Ambulatory Care Manager (ACM) attempted to contact the patient again by telephone to perform post ED visit assessment. Left HIPPA compliant message providing introduction to self and requested a call back. Patient has not responded to several attempts to contact. ACM will [facility]/ letter and sign off if no response.            Care Transitions ED Follow Up    Care Transitions Interventions

## 2021-10-04 NOTE — CARE COORDINATION
[facility] ED Follow Up Call    2021    Patient: Claudell Camara Patient : 1995   MRN: [id number]  Reason for Admission: facial tingling and numbness in hands  Discharge Date: 2021      Date/Time:  2021 1:36 PM  Attempted to reach patient by telephone. Call within 2 business days of discharge: Yes Left HIPPA compliant message requesting a return call. Will attempt to reach patient again. Inflammation Suggestive Of Cancer Camouflage Histology Text: There was a dense lymphocytic infiltrate which prevented adequate histologic evaluation of adjacent structures.

## 2022-01-06 ENCOUNTER — HOSPITAL ENCOUNTER (EMERGENCY)
Age: 27
Discharge: HOME | End: 2022-01-06

## 2022-01-06 VITALS
TEMPERATURE: 98.1 F | HEART RATE: 85 BPM | OXYGEN SATURATION: 99 % | HEIGHT: 67 IN | WEIGHT: 153 LBS | SYSTOLIC BLOOD PRESSURE: 138 MMHG | BODY MASS INDEX: 24.01 KG/M2 | DIASTOLIC BLOOD PRESSURE: 91 MMHG | RESPIRATION RATE: 14 BRPM

## 2022-01-06 RX ORDER — ESCITALOPRAM OXALATE 5 MG/1
5 TABLET ORAL DAILY
COMMUNITY

## 2022-01-06 RX ORDER — HYDROXYZINE PAMOATE 25 MG/1
25 CAPSULE ORAL 3 TIMES DAILY PRN
COMMUNITY

## 2022-01-06 ASSESSMENT — PAIN DESCRIPTION - LOCATION: LOCATION: BACK

## 2022-01-06 ASSESSMENT — PAIN SCALES - GENERAL: PAINLEVEL_OUTOF10: 2

## 2022-01-06 ASSESSMENT — PAIN DESCRIPTION - DESCRIPTORS: DESCRIPTORS: ACHING

## 2022-01-06 ASSESSMENT — PAIN DESCRIPTION - ORIENTATION: ORIENTATION: LOWER

## 2022-01-06 ASSESSMENT — PAIN DESCRIPTION - FREQUENCY: FREQUENCY: CONTINUOUS

## 2022-01-06 NOTE — ED TRIAGE NOTES
Arrived ambulatory to room 6 for triage. Tolerated without difficulty. Bed in lowest position. Call light given.  Gowned for exam.

## 2022-01-06 NOTE — ED NOTES
States did not want to take up anyone's time. States is going to the Urgent Care.       Mary Sy RN  01/06/22 1158

## 2022-01-10 ENCOUNTER — OFFICE VISIT (OUTPATIENT)
Dept: INTERNAL MEDICINE CLINIC | Age: 27
End: 2022-01-10

## 2022-01-10 VITALS
HEIGHT: 68 IN | OXYGEN SATURATION: 99 % | TEMPERATURE: 97.1 F | HEART RATE: 82 BPM | WEIGHT: 155 LBS | BODY MASS INDEX: 23.49 KG/M2

## 2022-01-10 DIAGNOSIS — J02.9 PHARYNGITIS, UNSPECIFIED ETIOLOGY: Primary | ICD-10-CM

## 2022-01-10 LAB — S PYO AG THROAT QL: NORMAL

## 2022-01-10 PROCEDURE — 99213 OFFICE O/P EST LOW 20 MIN: CPT | Performed by: NURSE PRACTITIONER

## 2022-01-10 PROCEDURE — 87880 STREP A ASSAY W/OPTIC: CPT | Performed by: NURSE PRACTITIONER

## 2022-01-10 RX ORDER — AMOXICILLIN 500 MG/1
500 CAPSULE ORAL 2 TIMES DAILY
Qty: 20 CAPSULE | Refills: 0 | Status: SHIPPED | OUTPATIENT
Start: 2022-01-10 | End: 2022-01-20

## 2022-01-10 NOTE — PROGRESS NOTES
1/10/2022    HPI:  Chief complaint and history of present illness as per medical assistant/nurse documented today in the Flu/COVID-19 clinic. MEDICATIONS:  Prior to Visit Medications    Medication Sig Taking? Authorizing Provider   amoxicillin (AMOXIL) 500 MG capsule Take 1 capsule by mouth 2 times daily for 10 days Yes April T MAYKEL HoffmanN - CNP   escitalopram (LEXAPRO) 5 MG tablet Take 5 mg by mouth daily  Historical Provider, MD   hydrOXYzine (VISTARIL) 25 MG capsule Take 25 mg by mouth 3 times daily as needed for Itching  Historical Provider, MD       No Known Allergies,   Past Medical History:   Diagnosis Date    Generalized anxiety disorder 3/4/2014    Did not tolerate bupar at 15mg BID, does well at 7.5mg BID. Off meds now, however.  Ovarian cyst     Palpitations 4/15/2014    Neg w/u with cardio, see chart.  Panic attacks     Pre-syncope 4/15/2014    Neg w/u with cardio, see chart.  PVC (premature ventricular contraction)    ,   Past Surgical History:   Procedure Laterality Date    ESOPHAGOSCOPY      WISDOM TOOTH EXTRACTION         PHYSICAL EXAM:  Physical Exam  Vitals reviewed. Constitutional:       Appearance: She is ill-appearing. HENT:      Head: Normocephalic and atraumatic. Right Ear: Tympanic membrane, ear canal and external ear normal.      Left Ear: Ear canal and external ear normal. Tympanic membrane is bulging. Nose: Nose normal.      Mouth/Throat:      Mouth: Mucous membranes are moist.      Pharynx: Oropharyngeal exudate and posterior oropharyngeal erythema present. Tonsils: Tonsillar exudate present. 2+ on the left. Eyes:      Pupils: Pupils are equal, round, and reactive to light. Pulmonary:      Effort: Pulmonary effort is normal.      Breath sounds: Normal breath sounds. Lymphadenopathy:      Head:      Left side of head: Submandibular and tonsillar adenopathy present. Cervical: Cervical adenopathy present.    Neurological:      General: No focal deficit present. Mental Status: She is alert and oriented to person, place, and time. Psychiatric:         Mood and Affect: Mood normal.         Behavior: Behavior normal.         Vitals:    01/10/22 1007   Pulse: 82   Temp: 97.1 °F (36.2 °C)   SpO2: 99%           ASSESSMENT/PLAN:  1. Pharyngitis, unspecified etiology  Throat pain started 3 days ago. No other S/S   Pt feel that it is getting worse. There is white patches in the throat and on the left tonsil. Left tonsil is inflamed. Pain in left ear, with pressure. Pain in the left side of her neck. - POCT rapid strep A  - amoxicillin (AMOXIL) 500 MG capsule; Take 1 capsule by mouth 2 times daily for 10 days  Dispense: 20 capsule; Refill: 0                I did don appropriate PPE (including N95 face mask, protective safety glasses, face shield, gloves, and gown) as recommended by the health facility/national standard best practice, during my interaction with the patient. FOLLOW-UP:  No follow-ups on file.     In addition to other information, the printed after visit summary provided to the patient includes:  [x] COVID-19 Self care instructions  [x] COVID-19 General patient information    RITESH Arias - CNP

## 2022-01-10 NOTE — PROGRESS NOTES
1/10/22  Linda Cazares  1995    FLU/COVID-19 CLINIC EVALUATION    HPI SYMPTOMS:    Employer:    [] Fevers  [] Chills  [] Cough  [] Coughing up blood  [] Chest Congestion  [] Nasal Congestion  [] Feeling short of breath  [] Sometimes  [] Frequently  [] All the time  [] Chest pain  [] Headaches  []Tolerable  [] Severe  [x] Sore throat  [] Muscle aches  [] Nausea  [] Vomiting  []Unable to keep fluids down  [] Diarrhea  []Severe    [] OTHER SYMPTOMS:      Symptom Duration:   [] 1  [] 2   [x] 3   [] 4    [] 5   [] 6   [] 7   [] 8   [] 9   [] 10   [] 11   [] 12   [] 13   [] 14   [] Longer than 14 days    Symptom course:   [x] Worsening     [] Stable     [] Improving    RISK FACTORS:    [] Pregnant or possibly pregnant  [] Age over 61  [] Diabetes  [] Heart disease  [] Asthma  [] COPD/Other chronic lung diseases  [] Active Cancer  [] On Chemotherapy  [] Taking oral steroids  [] History Lymphoma/Leukemia  [] Close contact with a lab confirmed COVID-19 patient within 14 days of symptom onset  [] History of travel from affected geographical areas within 14 days of symptom onset       VITALS:  Vitals:    01/10/22 1007   Pulse: 82   Temp: 97.1 °F (36.2 °C)   SpO2: 99%   Weight: 155 lb (70.3 kg)   Height: 5' 8\" (1.727 m)            An  electronic signature was used to authenticate this note.      --Michaela Cameron MA on 1/10/2022 at 10:07 AM

## 2022-03-15 ENCOUNTER — HOSPITAL ENCOUNTER (EMERGENCY)
Age: 27
Discharge: HOME | End: 2022-03-15
Attending: EMERGENCY MEDICINE
Payer: COMMERCIAL

## 2022-03-15 VITALS
DIASTOLIC BLOOD PRESSURE: 66 MMHG | WEIGHT: 155 LBS | TEMPERATURE: 97.8 F | BODY MASS INDEX: 23.49 KG/M2 | RESPIRATION RATE: 18 BRPM | OXYGEN SATURATION: 98 % | SYSTOLIC BLOOD PRESSURE: 108 MMHG | HEIGHT: 68 IN | HEART RATE: 88 BPM
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
## 2022-03-15 LAB
HBV SURFACE AB TITR SER: 35.51 {TITER}
HEPATITIS B SURFACE ANTIGEN: NON REACTIVE
HEPATITIS C ANTIBODY: NON REACTIVE

## 2022-03-15 PROCEDURE — 86706 HEP B SURFACE ANTIBODY: CPT

## 2022-03-15 PROCEDURE — 87340 HEPATITIS B SURFACE AG IA: CPT

## 2022-03-15 PROCEDURE — 86803 HEPATITIS C AB TEST: CPT

## 2022-03-15 PROCEDURE — 99285 EMERGENCY DEPT VISIT HI MDM: CPT

## 2022-03-15 NOTE — ED NOTES
Discharge instructions reviewed with patient. No additional questions asked. Voiced understanding. Encouraged patient to follow up as discussed by the ED physician.      Kortney Brewer RN  03/15/22 8109

## 2022-03-15 NOTE — ED TRIAGE NOTES
Pt to ED via private auto with c/o body fluid exposure at work. Pt works at [employer]. 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 Pt immediately washed area with soap and water. Pt alert, oriented x 4.  RR even, unlabored. Skin pink/warm/dry. Ambulatory to room from triage.

## 2022-03-15 NOTE — ED NOTES
Blood collected from IV catheter in the right AC (no straight stick needles available). IV catheter removed, intact.       Kumar Moses, DEWEY  03/15/22 5692

## 2022-03-15 NOTE — ED PROVIDER NOTES
Triage Chief Complaint:   Body Fluid Exposure (pt had a work related needle stick to right thumb. pt works at [employer].  )    KAMERON Ibanez is a 32 y.o. female that presents to the ED with an accidental puncture wound to the right thumb. 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 There is no visible blood. Patient has had her immunizations for hepatitis B she is unsure of her titer tetanus shot is under 10 years. The source patient is not known to have HIV. Apparently she has so by their get a check to see whether this is from alcohol or even IV drugs in the event or IV drugs the patient will inform me and we will discuss the potential for postexposure prophylaxis for HIV. At this juncture patient declines intervention        Past Medical History:   Diagnosis Date    Generalized anxiety disorder 3/4/2014    Did not tolerate bupar at 15mg BID, does well at 7.5mg BID. Off meds now, however.  Ovarian cyst     Palpitations 4/15/2014    Neg w/u with cardio, see chart.  Panic attacks     Pre-syncope 4/15/2014    Neg w/u with cardio, see chart.       PVC (premature ventricular contraction)      Past Surgical History:   Procedure Laterality Date    ESOPHAGOSCOPY      WISDOM TOOTH EXTRACTION       Family History   Problem Relation Age of Onset    Colon Cancer Maternal Grandmother     Cancer Maternal Grandfather     Heart Disease Paternal Grandfather     Heart Attack Paternal Grandfather     Breast Cancer Neg Hx      Social History     Socioeconomic History    Marital status: Single     Spouse name: Not on file    Number of children: Not on file    Years of education: Not on file    Highest education level: Not on file   Occupational History    Not on file   Tobacco Use    Smoking status: Never Smoker    Smokeless tobacco: Never Used   Vaping Use    Vaping Use: Never used   Substance and Sexual Activity    Alcohol use: Yes     Comment: occasionally    Drug use: No    Sexual activity: Not Currently     Partners: Male   Other Topics Concern    Not on file   Social History Narrative    Not on file     Social Determinants of Health     Financial Resource Strain:     Difficulty of Paying Living Expenses: Not on file   Food Insecurity:     Worried About Running Out of Food in the Last Year: Not on file    Ryne of Food in the Last Year: Not on file   Transportation Needs:     Lack of Transportation (Medical): Not on file    Lack of Transportation (Non-Medical): Not on file   Physical Activity:     Days of Exercise per Week: Not on file    Minutes of Exercise per Session: Not on file   Stress:     Feeling of Stress : Not on file   Social Connections:     Frequency of Communication with Friends and Family: Not on file    Frequency of Social Gatherings with Friends and Family: Not on file    Attends Yarsani Services: Not on file    Active Member of [address] [place] [address] or Organizations: Not on file    Attends Club or Organization Meetings: Not on file    Marital Status: Not on file   Intimate Partner Violence:     Fear of Current or Ex-Partner: Not on file    Emotionally Abused: Not on file    Physically Abused: Not on file    Sexually Abused: Not on file   Housing Stability:     Unable to Pay for Housing in the Last Year: Not on file    Number of [place] in the Last Year: Not on file    Unstable Housing in the Last Year: Not on file     No current facility-administered medications for this encounter.      Current Outpatient Medications   Medication Sig Dispense Refill    escitalopram (LEXAPRO) 5 MG tablet Take 5 mg by mouth daily      hydrOXYzine (VISTARIL) 25 MG capsule Take 25 mg by mouth 3 times daily as needed for Itching       No Known Allergies      ROS:    Review of Systems   Skin: Positive for wound. All other systems reviewed and are negative. Nursing Notes Reviewed    Physical Exam:      ED Triage Vitals [03/15/22 1437]   Enc Vitals Group      /66      Pulse 88      Resp 18      Temp 97.8 °F (36.6 °C)      Temp Source Oral      SpO2 98 %      Weight 155 lb (70.3 kg)      Height 5' 7.5\" (1.715 m)      Head Circumference       Peak Flow       Pain Score       Pain Loc       Pain Edu? Excl. in [address]? Physical Exam  Vitals and nursing note reviewed. Constitutional:       Appearance: She is well-developed. HENT:      Head: Normocephalic and atraumatic. Eyes:      Pupils: Pupils are equal, round, and reactive to light. Musculoskeletal:         General: Normal range of motion. Cervical back: Normal range of motion and neck supple. Comments: Patient has a puncture wound though not visible to the radial aspect of the right thumb around the paronychia no active bleeding no ecchymosis full active range of motion. No visible foreign body   Skin:     General: Skin is warm and dry. Neurological:      Mental Status: She is alert and oriented to person, place, and time. I have reviewed and interpreted all of the currently available lab results from this visit (ifapplicable):  No results found for this visit on 03/15/22. Radiographs (if obtained):  [] The following radiograph wasinterpreted by myself in the absence of a radiologist:   [] Radiologist's Report Reviewed:  No orders to display         EKG (if obtained): (All EKG's are interpreted by myself in the absence of a cardiologist)    Chart review shows recent radiographs:  No results found. MDM:    Patient is reported puncture wound from a dental instrument no visible blood to the right thumb. Is a low risk wound she elects not to undergo posaconazole prophylaxis for HIV I sent a hepatitis C antibody hepatitis B antigen as well as a antibody for her titer.   She needs to follow-up        Clinical Impression:  1. Needle stick, hypodermic, accidental, initial encounter      Disposition referral (if applicable): Follow-up with the work health physician        Disposition medications (if applicable):  New Prescriptions    No medications on file           Levy Wade DO, FACEP      Comment: Please note this report has been produced using speech recognition software and maycontain errors related to that system including errors in grammar, punctuation, and spelling, as well as words and phrases that may be inappropriate. If there are any questions or concerns please feel free to contact thedictating provider for clarification.         Uyen Lopez DO  03/15/22 1500

## 2022-03-24 ENCOUNTER — HOSPITAL ENCOUNTER (OUTPATIENT)
Age: 27
Setting detail: SPECIMEN
Discharge: HOME | End: 2022-03-24

## 2022-03-24 PROCEDURE — 87389 HIV-1 AG W/HIV-1&-2 AB AG IA: CPT

## 2022-03-25 LAB — HIV SCREEN: NON REACTIVE

## 2022-05-02 ENCOUNTER — HOSPITAL ENCOUNTER (OUTPATIENT)
Age: 27
Setting detail: SPECIMEN
Discharge: HOME | End: 2022-05-02

## 2022-05-02 LAB
HBV SURFACE AB TITR SER: 39.35 {TITER}
HEPATITIS B SURFACE ANTIGEN: NON REACTIVE
HEPATITIS C ANTIBODY: NON REACTIVE

## 2022-05-02 PROCEDURE — 87389 HIV-1 AG W/HIV-1&-2 AB AG IA: CPT

## 2022-05-02 PROCEDURE — 87340 HEPATITIS B SURFACE AG IA: CPT

## 2022-05-02 PROCEDURE — 86803 HEPATITIS C AB TEST: CPT

## 2022-05-02 PROCEDURE — 86706 HEP B SURFACE ANTIBODY: CPT

## 2022-05-03 LAB — HIV SCREEN: NON REACTIVE
